# Patient Record
Sex: FEMALE | HISPANIC OR LATINO | Employment: UNEMPLOYED | URBAN - METROPOLITAN AREA
[De-identification: names, ages, dates, MRNs, and addresses within clinical notes are randomized per-mention and may not be internally consistent; named-entity substitution may affect disease eponyms.]

---

## 2017-06-02 ENCOUNTER — GYNECOLOGY VISIT (OUTPATIENT)
Dept: OBGYN | Facility: MEDICAL CENTER | Age: 42
End: 2017-06-02
Payer: COMMERCIAL

## 2017-06-02 VITALS
HEIGHT: 60 IN | SYSTOLIC BLOOD PRESSURE: 100 MMHG | BODY MASS INDEX: 39.66 KG/M2 | WEIGHT: 202 LBS | DIASTOLIC BLOOD PRESSURE: 70 MMHG

## 2017-06-02 DIAGNOSIS — Z30.49 ENCOUNTER FOR SURVEILLANCE OF OTHER CONTRACEPTIVE: ICD-10-CM

## 2017-06-02 DIAGNOSIS — Z12.39 SCREENING FOR BREAST CANCER: ICD-10-CM

## 2017-06-02 DIAGNOSIS — Z97.5 IUD (INTRAUTERINE DEVICE) IN PLACE: ICD-10-CM

## 2017-06-02 PROCEDURE — 99203 OFFICE O/P NEW LOW 30 MIN: CPT | Performed by: OBSTETRICS & GYNECOLOGY

## 2017-06-02 NOTE — PROGRESS NOTES
Chief Complaint   Patient presents with   • Other     Discuss IUD removal and tubal ligation.        History of present illness: 42 y.o. presents with above chief complaint. Pt currently has a Mirena IUD in place and desires to have it removed and have a BTL. Pt states she had the Mirena placed for heavy bleeding as well as contraception. She has done well with the Mirena but due to . Pt thought BTL would control her bleeding as well as Mirena but was permanent. Pt received her pap this year at [facility] and was normal. Will obtain mammo from [facility]. Denies irregular bleeding, discharge, fever, chills.      Review of systems:  Pertinent positives documented in HPI and a comprehensive review of system is negative as follows:    Constitutional ROS: No unexpected change in weight, No weakness, No fatigue, No unexplained fevers, sweats, or chills  Mouth/Throat ROS: No bleeding gums, No sore throat, No recent change in voice or hoarseness  Neck ROS: No lumps or masses, No swollen glands, No significant pain in neck  Pulmonary ROS: No chronic cough, sputum, or hemoptysis, No dyspnea on exertion, No wheezing, No shortness of breath, No recent change in breathing  Cardiovascular ROS: No exercise intolerance, No chest pain, No shortness of breath, No palpitations, No syncope  Genitourinary ROS: Negative for dysuria, frequency and incontinence  Gastrointestinal ROS: No abdominal pain, No change in bowel habits, No significant change in appetite, No nausea, vomiting, diarrhea, or constipation, No hematemesis, No abdominal bloating or early satiety  Breast ROS: No new breast lumps or masses, No severe breast pain, No nipple discharge  Musculoskeletal/Extremities ROS: No cyanosis, No peripheral edema, No pain, redness or swelling on the joints  Hematologic/Lymphatic ROS: No anemia, No abnormal bleeding, No chills, No bruising, No weight loss  Skin/Integumentary ROS: No evidence of bleeding or bruising, No abnormal skin  "lesions noted, No evidence of rash, No itching  Neurologic ROS: No chronic headaches, No seizures, No weakness  Psychiatric ROS: No depression, No anxiety, No psychosis    All PMH, PSH, allergies, social history and FH reviewed and updated today:  Past Medical History   Diagnosis Date   • GDM (gestational diabetes mellitus) 3/8/2012       Past Surgical History   Procedure Laterality Date   • Cholecystectomy  2000       Allergies:   Allergies   Allergen Reactions   • Penicillins Rash     Pt states \"I get a rash all over my body\".       Social History     Social History   • Marital Status:      Spouse Name: N/A   • Number of Children: N/A   • Years of Education: N/A     Occupational History   • Not on file.     Social History Main Topics   • Smoking status: Never Smoker    • Smokeless tobacco: Not on file   • Alcohol Use: No   • Drug Use: No   • Sexual Activity:     Partners: Male      Comment: Pt. states was on pelvic rest.     Other Topics Concern   • Not on file     Social History Narrative       History reviewed. No pertinent family history.    Physical exam:  Blood pressure 100/70, height 1.524 m (5'), weight 91.627 kg (202 lb), currently breastfeeding.    GENERAL APPEARANCE: healthy, alert, no distress, cooperative, smiling, obese  NECK nontender, no masses, thyromegaly or nodules  HEART RRR with normal S1 and S2 ,no murmurs, no gallops, no peripheral edema  LUNG clear to auscultation, normal respiratory effort  ABDOMEN Abdomen soft, non-tender. BS normal. No masses,  No organomegaly  FEMALE GYN: normal female external genitalia without lesions, no vaginal discharge noted, vulva pink without erythema or friability, urethra is normal without discharge or scarring, no bladder fullness or masses, normal vagina and normal vaginal tone, normal cervix, normal  uterus, size and consistency, normal adnexa without tenderness, IUD strings visible, normal anus and perineum.  No inguinal hernia " present.  EXTREMITIES:negative clubbing, cyanosis, edema    NEURO Awake, alert and oriented x 3, Normal gait, no sensory deficits  SKIN No rashes, or ulcers or lesions seen  PSYCHIATRIC: Patient shows appropriate affect, is alert and oriented x3, intact judgment and insight.        1. Encounter for surveillance of other contraceptive     2. IUD (intrauterine device) in place     3. Screening for breast cancer  MA-MAMMO SCREENING BILAT W/MICHEAL W/CAD     Spent  32 minutes in face-to-face patient contact in which greater than 50% of that visit was spent in counseling/coordination of care of contraceptive methods including medical and surgical options of care with risks and benefits. Discussed with patient her heavy menses can restart with removal of Mirena and BTL as contraceptive method. Also discussed risks and benefits of surgery. After discussion, pt will have another Mirena. Referral placed.

## 2017-06-02 NOTE — MR AVS SNAPSHOT
"Mavis Blackburn   2017 10:45 AM   Gynecology Visit   MRN: [id number]    Department[facility]   Dept Phone:  [phone]    Description:  Female : 1975   Provider:  Karla Whitfield M.D.           Reason for Visit     Other Discuss IUD removal and tubal ligation.       Allergies as of 2017     Allergen Noted Reactions    Penicillins 2012   Rash    Pt states \"I get a rash all over my body\".      You were diagnosed with     Encounter for surveillance of other contraceptive   [5486904]       IUD (intrauterine device) in place   [[id number]]       Screening for breast cancer   [[id number]]         Vital Signs     Blood Pressure Height Weight Body Mass Index Smoking Status       100/70 mmHg 1.524 m (5') 91.627 kg (202 lb) 39.45 kg/m2 Never Smoker        Basic Information     Date Of Birth Sex Race Ethnicity Preferred Language    1975 Female  or   Origin (Nigerian,Barbadian,Anguillan,Qatari, etc) Nigerian      Problem List              ICD-10-CM Priority Class Noted - Resolved    H. pylori  A04.8   2011 - Present    Postpartum care and examination of lactating mother Z39.1   2012 - Present    HEMORRHOIDS    2012 - Present    Chest pain R07.9   2015 - Present    Biliary colic K80.50   2016 - Present      Health Maintenance        Date Due Completion Dates    MAMMOGRAM 2018 (Done)    Override on 2017: Done ([facility]ammo [facility])    PAP SMEAR 2020 (Done), 2011    Override on 2017: Done ([facility] - normal per pt)    IMM DTaP/Tdap/Td Vaccine (2 - Td) 2022            Current Immunizations     Tdap Vaccine 2012 10:45 AM      Below and/or attached are the medications your provider expects you to take. Review all of your home medications and newly ordered medications with your provider and/or pharmacist. Follow medication instructions as directed by your provider and/or pharmacist. Please keep your " medication list with you and share with your provider. Update the information when medications are discontinued, doses are changed, or new medications (including over-the-counter products) are added; and carry medication information at all times in the event of emergency situations     Allergies:  PENICILLINS - Rash               Medications  Valid as of: June 02, 2017 - 11:33 AM    Generic Name Brand Name Tablet Size Instructions for use    Pantoprazole Sodium (Tablet Delayed Response) PROTONIX 40 MG Take 1 Tab by mouth every day.        Sucralfate (Tab) CARAFATE 1 GM Take 1 Tab by mouth 4 Times a Day,Before Meals and at Bedtime.        .                 Medicines prescribed today were sent to:     [id number] - [place] - [address]    [address] [place] [address]    Phone: [phone] Fax: [phone]    Open 24 Hours?: No      Medication refill instructions:       If your prescription bottle indicates you have medication refills left, it is not necessary to call your provider’s office. Please contact your pharmacy and they will refill your medication.    If your prescription bottle indicates you do not have any refills left, you may request refills at any time through one of the following ways: The online [employer] system (except Urgent Care), by calling your provider’s office, or by asking your pharmacy to contact your provider’s office with a refill request. Medication refills are processed only during regular business hours and may not be available until the next business day. Your provider may request additional information or to have a follow-up visit with you prior to refilling your medication.   *Please Note: Medication refills are assigned a new Rx number when refilled electronically. Your pharmacy may indicate that no refills were authorized even though a new prescription for the same medication is available at the pharmacy. Please request the medicine by name with the pharmacy before  contacting your provider for a refill.        Your To Do List     Future Labs/Procedures Complete By Expires    MA-MAMMO SCREENING BILAT W/MICHEAL W/CAD  As directed 6/2/2018      Referral     A referral request has been sent to our patient care coordination department. Please allow 3-5 business days for us to process this request and contact you either by phone or mail. If you do not hear from us by the 5th business day, please call us at [phone].           CrowdRise Access Code: [email or web address]  Expires: 7/2/2017 11:33 AM    CrowdRise  A secure, online tool to manage your health information     [employer]’s CrowdRise® is a secure, online tool that connects you to your personalized health information from the privacy of your home -- day or night - making it very easy for you to manage your healthcare. Once the activation process is completed, you can even access your medical information using the CrowdRise rubia, which is available for free in the Apple Rubia store or Google Play store.     CrowdRise provides the following levels of access (as shown below):   My Chart Features   [facility] Doctor [facility]  Specialists [facility]  Urgent  Care [facility]  Primary Care  Doctor   Email your healthcare team securely and privately 24/7 X X X    Manage appointments: schedule your next appointment; view details of past/upcoming appointments X      Request prescription refills. X      View recent personal medical records, including lab and immunizations X X X X   View health record, including health history, allergies, medications X X X X   Read reports about your outpatient visits, procedures, consult and ER notes X X X X   See your discharge summary, which is a recap of your hospital and/or ER visit that includes your diagnosis, lab results, and care plan. X X       How to register for CrowdRise:  1. Go to  [email or web address].  2. Click on the Sign Up Now box, which takes you to the New Member Sign Up page. You will  need to provide the following information:  a. Enter your Stockpile Access Code exactly as it appears at the top of this page. (You will not need to use this code after you’ve completed the sign-up process. If you do not sign up before the expiration date, you must request a new code.)   b. Enter your date of birth.   c. Enter your home email address.   d. Click Submit, and follow the next screen’s instructions.  3. Create a Devext ID. This will be your Stockpile login ID and cannot be changed, so think of one that is secure and easy to remember.  4. Create a Stockpile password. You can change your password at any time.  5. Enter your Password Reset Question and Answer. This can be used at a later time if you forget your password.   6. Enter your e-mail address. This allows you to receive e-mail notifications when new information is available in Stockpile.  7. Click Sign Up. You can now view your health information.    For assistance activating your Stockpile account, call [phone]

## 2017-10-19 ENCOUNTER — OFFICE VISIT (OUTPATIENT)
Dept: URGENT CARE | Facility: CLINIC | Age: 42
End: 2017-10-19
Payer: COMMERCIAL

## 2017-10-19 VITALS
DIASTOLIC BLOOD PRESSURE: 80 MMHG | OXYGEN SATURATION: 98 % | HEIGHT: 58 IN | HEART RATE: 70 BPM | WEIGHT: 204 LBS | BODY MASS INDEX: 42.82 KG/M2 | TEMPERATURE: 97.4 F | SYSTOLIC BLOOD PRESSURE: 120 MMHG | RESPIRATION RATE: 16 BRPM

## 2017-10-19 DIAGNOSIS — J30.89 ENVIRONMENTAL AND SEASONAL ALLERGIES: ICD-10-CM

## 2017-10-19 DIAGNOSIS — J01.90 ACUTE BACTERIAL SINUSITIS: ICD-10-CM

## 2017-10-19 DIAGNOSIS — B96.89 ACUTE BACTERIAL SINUSITIS: ICD-10-CM

## 2017-10-19 PROCEDURE — 99214 OFFICE O/P EST MOD 30 MIN: CPT | Performed by: NURSE PRACTITIONER

## 2017-10-19 RX ORDER — DOXYCYCLINE HYCLATE 100 MG
100 TABLET ORAL 2 TIMES DAILY
Qty: 14 TAB | Refills: 0 | Status: SHIPPED | OUTPATIENT
Start: 2017-10-19 | End: 2017-10-26

## 2017-10-19 RX ORDER — DOXYCYCLINE HYCLATE 100 MG
100 TABLET ORAL 2 TIMES DAILY
Qty: 14 TAB | Refills: 0 | Status: SHIPPED | OUTPATIENT
Start: 2017-10-19 | End: 2017-10-19

## 2017-10-19 RX ORDER — FLUTICASONE PROPIONATE 50 MCG
2 SPRAY, SUSPENSION (ML) NASAL DAILY
Qty: 16 G | Refills: 2 | Status: SHIPPED | OUTPATIENT
Start: 2017-10-19 | End: 2017-10-19

## 2017-10-19 RX ORDER — FLUTICASONE PROPIONATE 50 MCG
2 SPRAY, SUSPENSION (ML) NASAL DAILY
Qty: 16 G | Refills: 3 | Status: SHIPPED | OUTPATIENT
Start: 2017-10-19

## 2017-10-19 RX ORDER — TRIAMCINOLONE ACETONIDE 40 MG/ML
40 INJECTION, SUSPENSION INTRA-ARTICULAR; INTRAMUSCULAR ONCE
Status: COMPLETED | OUTPATIENT
Start: 2017-10-19 | End: 2017-10-19

## 2017-10-19 RX ADMIN — TRIAMCINOLONE ACETONIDE 40 MG: 40 INJECTION, SUSPENSION INTRA-ARTICULAR; INTRAMUSCULAR at 11:42

## 2017-10-19 ASSESSMENT — ENCOUNTER SYMPTOMS
MYALGIAS: 0
WHEEZING: 0
CHILLS: 0
NAUSEA: 0
SORE THROAT: 1
SHORTNESS OF BREATH: 0
COUGH: 1
SPUTUM PRODUCTION: 0
FEVER: 0
HEADACHES: 1
DIARRHEA: 0
EYE DISCHARGE: 0
EYE REDNESS: 0
ORTHOPNEA: 0

## 2017-10-19 NOTE — PROGRESS NOTES
"Subjective:      Mavis Blackburn is a 42 y.o. female who presents with Allergic Rhinitis (x 1 month, very itchy eyes, watery eyes,  headaches, runny nose and sore throat)            HPI New problem. 42 year old female with month long history of nasal congestion, headaches, runny nose, sore throat and itchy eyes. She has history of seasonal allergies this time of year. She denies fever, chills, myalgia, nausea or vomiting. She has tried otc claritin with no relief. Requesting kenalog shot here in clinic. She has never had one.  Allergies, medications and history reviewed by me today      Review of Systems   Constitutional: Negative for chills, fever and malaise/fatigue.   HENT: Positive for congestion and sore throat. Negative for ear pain.    Eyes: Negative for discharge and redness.        +itchy eyes.   Respiratory: Positive for cough. Negative for sputum production, shortness of breath and wheezing.    Cardiovascular: Negative for chest pain and orthopnea.   Gastrointestinal: Negative for diarrhea and nausea.   Musculoskeletal: Negative for myalgias.   Neurological: Positive for headaches.   Endo/Heme/Allergies: Negative for environmental allergies.          Objective:     /80   Pulse 70   Temp 36.3 °C (97.4 °F)   Resp 16   Ht 1.473 m (4' 10\")   Wt 92.5 kg (204 lb)   SpO2 98%   BMI 42.64 kg/m²      Physical Exam   Constitutional: She is oriented to person, place, and time. She appears well-developed and well-nourished. No distress.   HENT:   Head: Normocephalic and atraumatic.   Right Ear: External ear and ear canal normal. Tympanic membrane is not injected and not perforated. No middle ear effusion.   Left Ear: External ear and ear canal normal. Tympanic membrane is not injected and not perforated.  No middle ear effusion.   Nose: Mucosal edema present.   Mouth/Throat: Posterior oropharyngeal erythema present. No oropharyngeal exudate.   Nasal mucosa red bilaterally with edema.   Eyes: Conjunctivae " are normal. Right eye exhibits no discharge. Left eye exhibits no discharge.   Neck: Normal range of motion. Neck supple.   Cardiovascular: Normal rate, regular rhythm and normal heart sounds.    No murmur heard.  Pulmonary/Chest: Effort normal and breath sounds normal. No respiratory distress.   Musculoskeletal: Normal range of motion.   Normal movement of all 4 extremities.   Lymphadenopathy:     She has no cervical adenopathy.        Right: No supraclavicular adenopathy present.        Left: No supraclavicular adenopathy present.   Neurological: She is alert and oriented to person, place, and time. Gait normal.   Skin: Skin is warm and dry.   Psychiatric: She has a normal mood and affect. Her behavior is normal. Thought content normal.               Assessment/Plan:     1. Acute bacterial sinusitis  doxycycline (VIBRAMYCIN) 100 MG Tab    DISCONTINUED: doxycycline (VIBRAMYCIN) 100 MG Tab   2. Environmental and seasonal allergies  triamcinolone acetonide (KENALOG-40) injection 40 mg    fluticasone (FLONASE) 50 MCG/ACT nasal spray    DISCONTINUED: fluticasone (FLONASE) 50 MCG/ACT nasal spray   I have placed the below orders and discussed them with an approved delegating provider.  The MA is performing the below orders under the direction of Dr. Zay BURGESS Differential diagnosis, natural history, supportive care, and indications for immediate follow-up discussed at length.

## 2017-12-18 ENCOUNTER — OFFICE VISIT (OUTPATIENT)
Dept: URGENT CARE | Facility: PHYSICIAN GROUP | Age: 42
End: 2017-12-18
Payer: COMMERCIAL

## 2017-12-18 VITALS
DIASTOLIC BLOOD PRESSURE: 80 MMHG | TEMPERATURE: 98.6 F | SYSTOLIC BLOOD PRESSURE: 130 MMHG | RESPIRATION RATE: 18 BRPM | BODY MASS INDEX: 44.01 KG/M2 | HEIGHT: 57 IN | OXYGEN SATURATION: 97 % | WEIGHT: 204 LBS | HEART RATE: 72 BPM

## 2017-12-18 DIAGNOSIS — J01.40 ACUTE NON-RECURRENT PANSINUSITIS: Primary | ICD-10-CM

## 2017-12-18 PROCEDURE — 99214 OFFICE O/P EST MOD 30 MIN: CPT | Performed by: NURSE PRACTITIONER

## 2017-12-18 RX ORDER — DOXYCYCLINE HYCLATE 100 MG/1
100 CAPSULE ORAL 2 TIMES DAILY
Qty: 14 CAP | Refills: 0 | Status: SHIPPED | OUTPATIENT
Start: 2017-12-18 | End: 2019-03-25

## 2017-12-18 ASSESSMENT — ENCOUNTER SYMPTOMS
HEADACHES: 1
MYALGIAS: 0
SINUS PAIN: 1
CHILLS: 0
SORE THROAT: 1
SHORTNESS OF BREATH: 0
SPUTUM PRODUCTION: 1
VOMITING: 0
COUGH: 1
WEAKNESS: 1
DIARRHEA: 0
SINUS PRESSURE: 1
FEVER: 0
NAUSEA: 0

## 2017-12-19 NOTE — PATIENT INSTRUCTIONS
Sinusitis en adultos  (Sinusitis, Adult)  La sinusitis es el enrojecimiento, el dolor y la inflamación de los senos paranasales. Los senos paranasales son cavidades de aire que están dentro de los huesos de la neal. Se encuentran debajo de los ojos, en la mitad de la frente y encima de los ojos. En los senos paranasales sanos, el moco puede drenar y el aire circula a través de ellos en arreola sarath hacia la nariz. Sin embargo, cuando se inflaman, el moco y el aire quedan atrapados. [place] hace que se desarrollen bacterias y otros gérmenes que causan infección.  La sinusitis puede desarrollarse rápidamente y durar solo un tiempo corto (aguda) o continuar por un período sheldon (crónica). La sinusitis que dura más de 12 semanas se considera crónica.  CAUSAS  Las causas de la sinusitis son:  · Alergias.  · Las anomalías estructurales, kam el desplazamiento del cartílago que separa las fosas nasales (desvío del tabique), que puede disminuir el flujo de aire por la nariz y los senos paranasales, y afectar arreola drenaje.  · Las anomalías funcionales, kam cuando los pequeños pelos (cilias) que se encuentran en los senos paranasales y ayudan a eliminar el moco no funcionan correctamente o no están presentes.  SIGNOS Y SÍNTOMAS  Los síntomas de la sinusitis aguda y crónica son los mismos. Los síntomas principales son el dolor y la presión alrededor de los senos paranasales afectados. Otros síntomas son:  · Dolor en los dientes superiores.  · Dolor de oídos.  · Dolor de jyoti.  · Mal aliento.  · Disminución del sentido del olfato y del gusto.  · Tos, que empeora al acostarse.  · Fatiga.  · Fiebre.  · Drenaje de moco espeso por la nariz, que generalmente es de color gallito y puede contener pus (purulento).  · Hinchazón y calor en los senos paranasales afectados.  DIAGNÓSTICO  Arreola médico le realizará un examen físico. Nanci el examen, el médico puede hacer cualquiera de estas cosas para determinar si usted tiene sinusitis aguda o  crónica:  · Le revisará la nariz para buscar signos de crecimientos anormales en las fosas nasales (pólipos nasales).  · Palpará los senos paranasales afectados para buscar signos de infección.  · Observará la parte interna de los senos paranasales con un dispositivo que tiene radha trudi (endoscopio).  Si el médico sospecha que usted sufre sinusitis crónica, podrá indicar radha o más de las siguientes pruebas:  · Pruebas de alergia.  · Cultivo de las secreciones nasales. Se extrae radha muestra de moco de la nariz, que se envía al laboratorio para detectar bacterias.  · Citología nasal. Se extrae radha muestra de moco de la nariz, que el médico examina para determinar si la sinusitis está relacionada con radha alergia.  TRATAMIENTO  La mayoría de los casos de sinusitis aguda se deben a radha infección viral y se resuelven espontáneamente en un período de 10 días. A veces, se recetan medicamentos kam ayuda para aliviar los síntomas tanto de la sinusitis aguda kam de la crónica. Estos pueden incluir analgésicos, descongestivos, aerosoles nasales con corticoides o aerosoles de solución salina.  Sin embargo, para la sinusitis por infección bacteriana, el médico le recetará antibióticos. Los antibióticos son medicamentos que destruyen las bacterias que causan la infección.  Con poca frecuencia, la sinusitis tiene arreola origen en radha infección por hongos. En estos casos, el médico le recetará un medicamento antimicótico.  Para algunos casos de sinusitis crónica, es necesario someterse a radha cirugía. Generalmente, se trata de casos en los que la sinusitis se repite más de 3 veces al año, a pesar de otros tratamientos.  INSTRUCCIONES PARA EL CUIDADO EN EL HOGAR  · Beber abundante agua. Los líquidos ayudan a disolver el moco, para que drene más fácilmente de los senos paranasales.  · Use un humidificador.  · Inhale vapor de 3 a 4 veces al día (por ejemplo, siéntese en el baño con la ducha abierta).  · Aplíquese un paño tibio y húmedo en  la neal 3 o 4 veces al día o según las indicaciones del médico.  · Use un aerosol nasal salino para ayudar a humedecer y limpiar los senos nasales.  · Amonate los medicamentos solamente kam se lo haya indicado el médico.  · Si le recetaron un antimicótico o un antibiótico, asegúrese de terminarlos, incluso si comienza a sentirse mejor.  SOLICITE ATENCIÓN MÉDICA DE INMEDIATO SI:  · Siente más dolor o sufre tiffanie de jyoti intensos.  · Tiene náuseas, vómitos o somnolencia.  · Observa hinchazón alrededor del cande.  · Tiene problemas de visión.  · Presenta rigidez en el latrell.  · Tiene dificultad para respirar.     Esta información no tiene kam fin reemplazar el consejo del médico. Asegúrese de hacerle al médico cualquier pregunta que tenga.     Document Released: 09/27/2006 Document Revised: 01/08/2016  Elsevier Interactive Patient Education ©2016 Elsevier Inc.

## 2017-12-19 NOTE — PROGRESS NOTES
"Subjective:      Mavis Blackburn is a 42 y.o. female who presents with Cough (x2 weeks R Ear pain)            Medications, Allergies and Prior Medical Hx reviewed and updated in [facility].with patient/family today           Sinusitis   This is a new problem. The current episode started 1 to 4 weeks ago (2 wks). The problem has been gradually worsening since onset. There has been no fever. The pain is moderate. Associated symptoms include congestion, coughing, headaches, sinus pressure and a sore throat. Pertinent negatives include no chills, ear pain or shortness of breath. Past treatments include oral decongestants. The treatment provided mild relief.       Review of Systems   Constitutional: Positive for malaise/fatigue. Negative for chills and fever.   HENT: Positive for congestion, sinus pain, sinus pressure and sore throat. Negative for ear discharge and ear pain.    Respiratory: Positive for cough and sputum production. Negative for shortness of breath.    Gastrointestinal: Negative for diarrhea, nausea and vomiting.   Musculoskeletal: Negative for myalgias.   Neurological: Positive for weakness and headaches.          Objective:     /80   Pulse 72   Temp 37 °C (98.6 °F)   Resp 18   Ht 1.448 m (4' 9\")   Wt 92.5 kg (204 lb)   SpO2 97%   Breastfeeding? No   BMI 44.15 kg/m²      Physical Exam   Constitutional: She appears well-developed and well-nourished. No distress.   HENT:   Head: Normocephalic and atraumatic.   Right Ear: Tympanic membrane and ear canal normal.   Left Ear: Tympanic membrane and ear canal normal.   Nose: Rhinorrhea present.   Mouth/Throat: Uvula is midline and mucous membranes are normal. No trismus in the jaw. No uvula swelling. Posterior oropharyngeal edema and posterior oropharyngeal erythema present. No oropharyngeal exudate.   Eyes: Conjunctivae are normal. Pupils are equal, round, and reactive to light.   Neck: Neck supple.   Cardiovascular: Normal rate, regular rhythm and " normal heart sounds.    Pulmonary/Chest: Effort normal and breath sounds normal. No respiratory distress. She has no wheezes.   Lymphadenopathy:     She has cervical adenopathy.   Neurological: She is alert.   Skin: Skin is warm and dry. Capillary refill takes less than 2 seconds.   Psychiatric: She has a normal mood and affect. Her behavior is normal.   Vitals reviewed.              Assessment/Plan:       1. Acute non-recurrent pansinusitis  doxycycline (VIBRAMYCIN) 100 MG Cap           Modified Epic generated written imformation provided along with verbal instructions    Rest, Fluids, tylenol, ibuprofen, sinus irrigation,  humidified air, and otc decongestants  Pt will go to the ER for worsening or changing symptoms as discussed,   Follow-up with your primary care provider or return here if not improving in 5 - 7 days   Discharge instructions discussed with pt/family who verbalize understanding and agreement with poc

## 2018-10-16 ENCOUNTER — OFFICE VISIT (OUTPATIENT)
Dept: URGENT CARE | Facility: CLINIC | Age: 43
End: 2018-10-16
Payer: COMMERCIAL

## 2018-10-16 VITALS
TEMPERATURE: 97.5 F | HEIGHT: 59 IN | RESPIRATION RATE: 14 BRPM | OXYGEN SATURATION: 95 % | HEART RATE: 79 BPM | DIASTOLIC BLOOD PRESSURE: 90 MMHG | WEIGHT: 203 LBS | BODY MASS INDEX: 40.92 KG/M2 | SYSTOLIC BLOOD PRESSURE: 130 MMHG

## 2018-10-16 DIAGNOSIS — K64.9 HEMORRHOIDS, UNSPECIFIED HEMORRHOID TYPE: ICD-10-CM

## 2018-10-16 DIAGNOSIS — K59.00 CONSTIPATION, UNSPECIFIED CONSTIPATION TYPE: ICD-10-CM

## 2018-10-16 DIAGNOSIS — F41.9 ANXIETY: ICD-10-CM

## 2018-10-16 PROCEDURE — 99214 OFFICE O/P EST MOD 30 MIN: CPT | Performed by: PHYSICIAN ASSISTANT

## 2018-10-16 RX ORDER — HYDROCORTISONE ACETATE 25 MG/1
25 SUPPOSITORY RECTAL EVERY 12 HOURS
Qty: 10 SUPPOSITORY | Refills: 0 | Status: SHIPPED | OUTPATIENT
Start: 2018-10-16 | End: 2018-10-21

## 2018-10-16 RX ORDER — HYDROXYZINE PAMOATE 25 MG/1
25 CAPSULE ORAL EVERY 8 HOURS PRN
Qty: 28 CAP | Refills: 0 | Status: SHIPPED | OUTPATIENT
Start: 2018-10-16

## 2018-10-18 ASSESSMENT — ENCOUNTER SYMPTOMS
CHILLS: 0
SORE THROAT: 0
BLOOD IN STOOL: 0
SHORTNESS OF BREATH: 0
DIARRHEA: 0
EYE REDNESS: 0
CHANGE IN BOWEL HABIT: 1
NAUSEA: 0
WHEEZING: 0
VOMITING: 0
CONSTIPATION: 0
FEVER: 0
FALLS: 0
ABDOMINAL PAIN: 0
HEADACHES: 0
EYE DISCHARGE: 0

## 2018-10-18 NOTE — PROGRESS NOTES
"Subjective:      Mavis Blackburn is a 43 y.o. female who presents with Constipation (hemorrhoids x3 days)            Patient is a 43-year-old female who presents with hemorrhoids for the last 2-3 days.  Patient reports she believes this began after an episode of constipation of which made her hemorrhoids worse.  She denies any bleeding, abdominal pain or blood in her stool.  She does report prior history of same after her last child of which is gradually gotten worse.  Also of note today patient is requesting something for anxiety at this time to take as needed.   along with a chaperone was present during the visit today.      Hemorrhoids   This is a new problem. The current episode started in the past 7 days. The problem occurs constantly. The problem has been unchanged. Associated symptoms include a change in bowel habit. Pertinent negatives include no abdominal pain, chills, fever, headaches, nausea, rash, sore throat or vomiting. Exacerbated by: Sitting or wiping. Treatments tried: Hemorrhoid cream. The treatment provided mild relief.       Review of Systems   Constitutional: Negative for chills, fever and malaise/fatigue.   HENT: Negative for sore throat.    Eyes: Negative for discharge and redness.   Respiratory: Negative for shortness of breath and wheezing.    Gastrointestinal: Positive for change in bowel habit and hemorrhoids. Negative for abdominal pain, blood in stool, constipation, diarrhea, melena, nausea and vomiting.   Genitourinary: Negative for dysuria, frequency, hematuria and urgency.   Musculoskeletal: Negative for falls and joint pain.   Skin: Negative for itching and rash.   Neurological: Negative for headaches.   All other systems reviewed and are negative.         Objective:     /90 (BP Location: Left arm, Patient Position: Sitting, BP Cuff Size: Adult)   Pulse 79   Temp 36.4 °C (97.5 °F)   Resp 14   Ht 1.486 m (4' 10.5\")   Wt 92.1 kg (203 lb)   SpO2 95%   BMI 41.70 " "kg/m²    PMH: reviewed.   MEDS:   Current Outpatient Prescriptions:   •  hydrocortisone (ANUSOL-HC) 25 MG Suppos, Insert 1 Suppository in rectum every 12 hours for 5 days., Disp: 10 Suppository, Rfl: 0  •  docusate sodium (COLACE) 50 MG Cap, Take 2 Caps by mouth 2 times a day., Disp: 20 Cap, Rfl: 0  •  hydrOXYzine pamoate (VISTARIL) 25 MG Cap, Take 1 Cap by mouth every 8 hours as needed for Anxiety (May cause sedation)., Disp: 28 Cap, Rfl: 0  •  doxycycline (VIBRAMYCIN) 100 MG Cap, Take 1 Cap by mouth 2 times a day., Disp: 14 Cap, Rfl: 0  •  fluticasone (FLONASE) 50 MCG/ACT nasal spray, Spray 2 Sprays in nose every day., Disp: 16 g, Rfl: 3  •  pantoprazole (PROTONIX) 40 MG TBEC, Take 1 Tab by mouth every day., Disp: 30 Tab, Rfl: 0  ALLERGIES:   Allergies   Allergen Reactions   • Penicillins Rash     Pt states \"I get a rash all over my body\".     SURGHX:   Past Surgical History:   Procedure Laterality Date   • CHOLECYSTECTOMY  2000     SOCHX:  reports that she has never smoked. She has never used smokeless tobacco. She reports that she does not drink alcohol or use drugs.  FH: Family history was reviewed, no pertinent findings to report    Physical Exam   Constitutional: She is oriented to person, place, and time. She appears well-developed and well-nourished. No distress.   HENT:   Head: Normocephalic and atraumatic.   Eyes: Pupils are equal, round, and reactive to light. Conjunctivae and EOM are normal.   Neck: Normal range of motion. Neck supple. No tracheal deviation present.   Cardiovascular: Normal rate.    Pulmonary/Chest: Effort normal.   Abdominal: Soft. Bowel sounds are normal. There is no tenderness.   Genitourinary:         Genitourinary Comments: -External hemorrhoid nonthrombosed -notable tenderness on exam.   With slight fluctuance without surrounding induration or active bleeding. Without fissures.    Musculoskeletal: Normal range of motion. She exhibits no edema.   Neurological: She is alert and " oriented to person, place, and time. Coordination normal.   Skin: Skin is warm. No rash noted.   Psychiatric: She has a normal mood and affect. Her behavior is normal. Judgment and thought content normal.   Vitals reviewed.              Assessment/Plan:     1. Hemorrhoids, unspecified hemorrhoid type  - hydrocortisone (ANUSOL-HC) 25 MG Suppos; Insert 1 Suppository in rectum every 12 hours for 5 days.  Dispense: 10 Suppository; Refill: 0    2. Constipation, unspecified constipation type  - docusate sodium (COLACE) 50 MG Cap; Take 2 Caps by mouth 2 times a day.  Dispense: 20 Cap; Refill: 0    3. Anxiety  - hydrOXYzine pamoate (VISTARIL) 25 MG Cap; Take 1 Cap by mouth every 8 hours as needed for Anxiety (May cause sedation).  Dispense: 28 Cap; Refill: 0    Encouraged sitz baths, continue with lidocaine cream- will write for anusol.   Avoid constipation- will write for stool softener.  Increase water.  We will also write for hydroxyzine for anxiety discussed the risk of sedation.  Patient given precautionary s/sx that mandate immediate follow up and evaluation in the ED. Advised of risks of not doing so.    DDX, Supportive care, and indications for immediate follow-up discussed with patient.    Instructed to return to clinic or nearest emergency department if we are not available for any change in condition, further concerns, or worsening of symptoms.    The patient demonstrated a good understanding and agreed with the treatment plan.  Please note that this dictation was created using voice recognition software. I have made every reasonable attempt to correct obvious errors, but I expect that there are errors of grammar and possibly content that I did not discover before finalizing the note.

## 2018-10-19 ENCOUNTER — OFFICE VISIT (OUTPATIENT)
Dept: URGENT CARE | Facility: CLINIC | Age: 43
End: 2018-10-19
Payer: COMMERCIAL

## 2018-10-19 VITALS
TEMPERATURE: 97.7 F | RESPIRATION RATE: 16 BRPM | SYSTOLIC BLOOD PRESSURE: 120 MMHG | DIASTOLIC BLOOD PRESSURE: 74 MMHG | HEART RATE: 72 BPM | OXYGEN SATURATION: 97 % | BODY MASS INDEX: 40.92 KG/M2 | WEIGHT: 203 LBS | HEIGHT: 59 IN

## 2018-10-19 DIAGNOSIS — K64.4 EXTERNAL HEMORRHOID: Primary | ICD-10-CM

## 2018-10-19 PROCEDURE — 99214 OFFICE O/P EST MOD 30 MIN: CPT | Performed by: PHYSICIAN ASSISTANT

## 2018-10-19 RX ORDER — LIDOCAINE HCL AND HYDROCORTISONE ACETATE 30; 10 MG/G; MG/G
1 CREAM RECTAL 2 TIMES DAILY
Qty: 1 KIT | Refills: 1
Start: 2018-10-19

## 2018-10-20 NOTE — PATIENT INSTRUCTIONS
Hemorroides  (Hemorrhoids)  Las hemorroides son venas inflamadas adentro o alrededor del recto o del ano. Hay dos tipos de hemorroides:  · Hemorroides internas. Se eddy en las venas del interior del recto. Pueden abultarse hacia afuera, irritarse y doler.  · Hemorroides externas. Se producen en las venas externas del ano y pueden sentirse kam un bulto o david hinchada, dura y dolorosa cerca del ano.  La mayoría de las hemorroides no causan problemas graves y se pueden controlar con tratamientos caseros kam los cambios en la dieta y el estilo de carie. Si los tratamientos caseros no ayudan con los síntomas, se pueden realizar procedimientos para reducir o extirpar las hemorroides.  CAUSAS  La causa de esta afección es el aumento de la presión en la david anal. Esta presión puede ser causada por distintos factores, por ejemplo:  · Estreñimiento.  · Dificultad para defecar.  · Diarrea.  · Embarazo.  · Obesidad.  · Estar sentado vishnu largos períodos.  · Levantar objetos pesados u otras actividades que impliquen esfuerzo.  · Sexo anal.  SÍNTOMAS  Los síntomas de esta afección incluyen lo siguiente:  · Dolor.  · Picazón o irritación anal.  · Sangrado rectal.  · Pérdida de materia fecal (heces).  · Inflamación anal.  · Facundo o más bultos alrededor del ano.  DIAGNÓSTICO  Esta afección se diagnostica frecuentemente a través de un examen visual. Posiblemente le realicen otros tipos de pruebas o estudios, kam los siguientes:  · Examen de la david rectal con radha mano enguantada (examen digital rectal).  · Examen del canal anal utilizando un pequeño tubo (anoscopio).  · Análisis de didier si ha perdido radha cantidad significativa de didier.  · Un estudio para observar el interior del colon (sigmoidoscopia o colonoscopia).  TRATAMIENTO  Esta afección generalmente se puede tratar en el hogar. Se pueden realizar diversos procedimientos si los cambios en la dieta, en el estilo de carie y otros tratamientos caseros no alivian los  síntomas. Estos procedimientos pueden ayudar a reducir o extirpar las hemorroides completamente. Algunos de estos procedimientos son quirúrgicos y otros no. Algunos de los procedimientos más frecuentes son los siguientes:  · Ligadura con silverio elástica. Las bandas elásticas se colocan en la base de las hemorroides para interrumpir la irrigación de didier.  · Escleroterapia. Se inyecta un medicamento en las hemorroides para reducir arreola tamaño.  · Coagulación con trudi infrarroja. Se utiliza un tipo de energía lumínica para eliminar las hemorroides.  · Hemorroidectomía. Las hemorroides se extirpan con cirugía y las venas que las irrigan se atan.  · Hemorroidopexia con grapas. Se usa un dispositivo tipo grapa de forma circular para extirpar las hemorroides y unas grapas para cortar la didier que se irriga hacia las hemorroides.  INSTRUCCIONES PARA EL CUIDADO EN EL HOGAR  Comida y bebida   · Consuma alimentos con alto contenido de fibra, kam cereales integrales, porotos, nelsy secos, frutas y verduras. Pregúntele a arreola médico acerca de geovanni productos con fibra añadida en ellos (complementos de fibra).  · Altagracia suficiente líquido para mantener la orina maria de jesus o de color amarillo pálido.  Control del dolor y la hinchazón   · [place] bryant de asiento tibios vishnu 20 minutos, 3 o 4 veces por día para calmar el dolor y las molestias.  · Si se lo indican, aplique hielo en la david afectada. Usar compresas de hielo entre los bryant de asiento puede ser efectivo.  ¨ Ponga el hielo en radha bolsa plástica.  ¨ Coloque radha toalla entre la piel y la bolsa de hielo.  ¨ Coloque el hielo vishnu 20 minutos, 2 a 3 veces por día.  Instrucciones generales   · [place] los medicamentos de venta rosario y los recetados solamente kam se lo haya indicado el médico.  · Aplíquese los medicamentos, cremas o supositorios kam se lo hayan indicado.  · Hugo ejercicios regularmente.  · Vaya al baño cuando sienta la necesidad de defecar. No espere.  · Evite  hacer fuerza al defecar.  · Mantenga la david anal limpia y seca. Use papel higiénico húmedo o toallitas humedecidas después de defecar.  · No pase mucho tiempo sentado en el inodoro. [place] aumenta la afluencia de didier y el dolor.  SOLICITE ATENCIÓN MÉDICA SI:  · Aumenta el dolor y la hinchazón, y no puede controlarlos con los medicamentos o con un tratamiento.  · Tiene radha hemorragia que no puede controlar.  · No puede defecar o lo hace con dificultad.  · Siente dolor o tiene inflamación fuera de la david de las hemorroides.  Esta información no tiene kam fin reemplazar el consejo del médico. Asegúrese de hacerle al médico cualquier pregunta que tenga.  Document Released: 12/18/2006 Document Revised: 04/10/2017 Document Reviewed: 08/31/2016  [employer] Interactive Patient Education © 2017 [employer]

## 2018-10-20 NOTE — PROGRESS NOTES
Subjective:   Pt is a 43 y.o. female who presents with Hemorrhoids (hemorrhoids not getting better still in a lot of pain was seen on 10/16/18)            HPI  Pt seen 3 days ago in the UC for this same reason. Pt has not tried the colace for constipation nor the sitz bathes nor changes her diet. Patient is a 43-year-old female who presents with hemorrhoids for the last 5-8 days.  Patient reports she believes this began after an episode of constipation of which made her hemorrhoids worse.  She denies any bleeding, abdominal pain or blood in her stool.  She does report prior history of same after her last child of which is gradually gotten worse.     along with a chaperone was present during the visit today.     Hemorrhoids   This is an acute on chronic  problem. The current episode started in the past 10 days. The problem occurs constantly. The problem has been unchanged. Associated symptoms include a change in bowel habit. Pertinent negatives include no abdominal pain, chills, fever, headaches, nausea, rash, sore throat or vomiting. Exacerbated by: Sitting or wiping. Treatments tried: Hemorrhoid cream and anusol suppositiories. The treatment provided mild relief. Pt has not taken any Rx medications for this condition. Pt states the pain is a 7-8/10, aching in nature and worse at night. Pt denies CP, SOB, NVD, paresthesias, headaches, dizziness, change in vision, hives, or other joint pain. The pt's medication list, problem list, and allergies have been evaluated and reviewed during today's visit.       PMH:  Past Medical History:   Diagnosis Date   • GDM (gestational diabetes mellitus) 3/8/2012       PSH:  Past Surgical History:   Procedure Laterality Date   • CHOLECYSTECTOMY         Fam Hx:  the patient's family history is not pertinent to their current complaint    Family Status   Relation Status   • Mo Alive   • Fa Alive   • Sis Alive   • Bro Alive   • MGMo    • MGFa    • PGMo     • PGFa        Soc HX:  Social History     Social History   • Marital status:      Spouse name: N/A   • Number of children: N/A   • Years of education: N/A     Occupational History   • Not on file.     Social History Main Topics   • Smoking status: Never Smoker   • Smokeless tobacco: Never Used   • Alcohol use No   • Drug use: No   • Sexual activity: Not Currently     Partners: Male     Other Topics Concern   • Not on file     Social History Narrative   • No narrative on file         Medications:    Current Outpatient Prescriptions:   •  Lidocaine-Hydrocortisone Ace 3-1 % Kit, Insert 1 Application in rectum 2 Times a Day., Disp: 1 Kit, Rfl: 1  •  hydrocortisone (ANUSOL-HC) 25 MG Suppos, Insert 1 Suppository in rectum every 12 hours for 5 days., Disp: 10 Suppository, Rfl: 0  •  docusate sodium (COLACE) 50 MG Cap, Take 2 Caps by mouth 2 times a day., Disp: 20 Cap, Rfl: 0  •  hydrOXYzine pamoate (VISTARIL) 25 MG Cap, Take 1 Cap by mouth every 8 hours as needed for Anxiety (May cause sedation)., Disp: 28 Cap, Rfl: 0  •  doxycycline (VIBRAMYCIN) 100 MG Cap, Take 1 Cap by mouth 2 times a day., Disp: 14 Cap, Rfl: 0  •  fluticasone (FLONASE) 50 MCG/ACT nasal spray, Spray 2 Sprays in nose every day., Disp: 16 g, Rfl: 3  •  pantoprazole (PROTONIX) 40 MG TBEC, Take 1 Tab by mouth every day., Disp: 30 Tab, Rfl: 0      Allergies:  Penicillins    ROS  Constitutional: Negative for fever, chills and malaise/fatigue.   HENT: Negative for congestion and sore throat.    Eyes: Negative for blurred vision, double vision and photophobia.   Respiratory: Negative for cough and shortness of breath.  Cardiovascular: Negative for chest pain and palpitations.   Gastrointestinal: Negative for heartburn, nausea, vomiting, abdominal pain, diarrhea and constipation.   Genitourinary: Negative for dysuria and flank pain. +hemorrhoids  Musculoskeletal: Negative for joint pain and myalgias.   Skin: Negative for itching and  "rash.   Neurological: Negative for dizziness, tingling and headaches.   Endo/Heme/Allergies: Does not bruise/bleed easily.   Psychiatric/Behavioral: Negative for depression. The patient is not nervous/anxious.           Objective:     /74 (BP Location: Right arm, Patient Position: Sitting)   Pulse 72   Temp 36.5 °C (97.7 °F) (Temporal)   Resp 16   Ht 1.486 m (4' 10.5\")   Wt 92.1 kg (203 lb)   SpO2 97%   BMI 41.70 kg/m²      Physical Exam   Genitourinary: Rectal exam shows external hemorrhoid. Rectal exam shows no fissure, no mass, no tenderness, anal tone normal and guaiac negative stool.       Pelvic exam was performed with patient in the knee-chest position. No labial fusion. There is no rash, tenderness, lesion or injury on the right labia. There is no rash, tenderness, lesion or injury on the left labia.         Constitutional: PT is oriented to person, place, and time. PT appears well-developed and well-nourished. No distress.   HENT:   Head: Normocephalic and atraumatic.   Mouth/Throat: Oropharynx is clear and moist. No oropharyngeal exudate.   Eyes: Conjunctivae normal and EOM are normal. Pupils are equal, round, and reactive to light.   Neck: Normal range of motion. Neck supple. No thyromegaly present.   Cardiovascular: Normal rate, regular rhythm, normal heart sounds and intact distal pulses.  Exam reveals no gallop and no friction rub.    No murmur heard.  Pulmonary/Chest: Effort normal and breath sounds normal. No respiratory distress. PT has no wheezes. PT has no rales. Pt exhibits no tenderness.   Abdominal: Soft. Bowel sounds are normal. PT exhibits no distension and no mass. There is no tenderness. There is no rebound and no guarding.   Musculoskeletal: Normal range of motion. PT exhibits no edema and no tenderness.   Neurological: PT is alert and oriented to person, place, and time. PT has normal reflexes. No cranial nerve deficit.   Skin: Skin is warm and dry. No rash noted. PT is not " diaphoretic. No erythema.       Psychiatric: PT has a normal mood and affect. PT behavior is normal. Judgment and thought content normal.          Assessment/Plan:     1. External hemorrhoid    - Lidocaine-Hydrocortisone Ace 3-1 % Kit; Insert 1 Application in rectum 2 Times a Day.  Dispense: 1 Kit; Refill: 1    Encouraged sitz baths, continue with lidocaine cream- will write for anusol cream instead of suppositories.   Avoid constipation- continue stool softener and add Benefiber OTC or Optifiber BID.  Increase water.    Patient given precautionary s/sx that mandate immediate follow up and evaluation in the ED. Advised of risks of not doing so.     DDX, Supportive care, and indications for immediate follow-up discussed with patient.    Instructed to return to clinic or nearest emergency department if we are not available for any change in condition, further concerns, or worsening of symptoms.  Referral to GI for definitve care per pt request

## 2019-03-25 ENCOUNTER — HOSPITAL ENCOUNTER (OUTPATIENT)
Dept: RADIOLOGY | Facility: MEDICAL CENTER | Age: 44
End: 2019-03-25
Attending: FAMILY MEDICINE
Payer: COMMERCIAL

## 2019-03-25 ENCOUNTER — OFFICE VISIT (OUTPATIENT)
Dept: URGENT CARE | Facility: PHYSICIAN GROUP | Age: 44
End: 2019-03-25
Payer: COMMERCIAL

## 2019-03-25 VITALS
WEIGHT: 205 LBS | BODY MASS INDEX: 42.11 KG/M2 | DIASTOLIC BLOOD PRESSURE: 84 MMHG | HEART RATE: 80 BPM | RESPIRATION RATE: 16 BRPM | TEMPERATURE: 98 F | SYSTOLIC BLOOD PRESSURE: 132 MMHG | OXYGEN SATURATION: 96 %

## 2019-03-25 DIAGNOSIS — N92.6 MISSED MENSES: ICD-10-CM

## 2019-03-25 DIAGNOSIS — J22 LOWER RESPIRATORY INFECTION: ICD-10-CM

## 2019-03-25 DIAGNOSIS — H66.001 ACUTE SUPPURATIVE OTITIS MEDIA OF RIGHT EAR WITHOUT SPONTANEOUS RUPTURE OF TYMPANIC MEMBRANE, RECURRENCE NOT SPECIFIED: ICD-10-CM

## 2019-03-25 LAB
INT CON NEG: NEGATIVE
INT CON NEG: NEGATIVE
INT CON POS: POSITIVE
INT CON POS: POSITIVE
POC URINE PREGNANCY TEST: NEGATIVE
S PYO AG THROAT QL: NEGATIVE

## 2019-03-25 PROCEDURE — 71046 X-RAY EXAM CHEST 2 VIEWS: CPT

## 2019-03-25 PROCEDURE — 87880 STREP A ASSAY W/OPTIC: CPT | Performed by: FAMILY MEDICINE

## 2019-03-25 PROCEDURE — 99214 OFFICE O/P EST MOD 30 MIN: CPT | Performed by: FAMILY MEDICINE

## 2019-03-25 PROCEDURE — 81025 URINE PREGNANCY TEST: CPT | Performed by: FAMILY MEDICINE

## 2019-03-25 RX ORDER — DOXYCYCLINE HYCLATE 100 MG
100 TABLET ORAL 2 TIMES DAILY
Qty: 14 TAB | Refills: 0 | Status: SHIPPED | OUTPATIENT
Start: 2019-03-25 | End: 2023-03-15

## 2019-03-25 ASSESSMENT — ENCOUNTER SYMPTOMS
HEADACHES: 0
ABDOMINAL PAIN: 0
NAUSEA: 0
FEVER: 1
VOMITING: 0
DIARRHEA: 0
COUGH: 1
SHORTNESS OF BREATH: 0
SORE THROAT: 1

## 2019-03-26 NOTE — PROGRESS NOTES
Subjective:     Mavis Davison is a 43 y.o. female who presents for Pharyngitis (right ear pain,sore throat,fever, off and on x 2-3 weeks)    HPI  Pt presents for evaluation of a new problem   Pt with sore throat and right ear pain for the past 2-3 weeks   Sore throat is constant, worse with swallowing, and worse on the right side   Also has right sided ear pain   Has a moderate cough   Cough is dry and nonproductive  Cough is overall stable and not worsening  Feels congestion in her chest  Feeling fatigued    Review of Systems   Constitutional: Positive for fever and malaise/fatigue.   HENT: Positive for congestion and sore throat.    Respiratory: Positive for cough. Negative for shortness of breath.    Cardiovascular: Negative for chest pain.   Gastrointestinal: Negative for abdominal pain, diarrhea, nausea and vomiting.   Skin: Negative for rash.   Neurological: Negative for headaches.     PMH:  has a past medical history of GDM (gestational diabetes mellitus) (3/8/2012). She also has no past medical history of Addisons disease (HCC); Adrenal disorder (HCC); Allergy; Anemia; Anxiety; Arrhythmia; Arthritis; ASTHMA; Blood transfusion; Cancer (HCC); CATARACT; CHF (congestive heart failure) (HCC); Clotting disorder (HCC); COPD; Cushings syndrome (HCC); Depression; Diabetic neuropathy (HCC); EMPHYSEMA; GERD (gastroesophageal reflux disease); Glaucoma; Goiter; Headache(784.0); Heart attack (HCC); Heart murmur; HIV (human immunodeficiency virus infection); Hyperlipidemia; Hypertension; IBD (inflammatory bowel disease); Kidney disease; Meningitis; Migraine; Muscle disorder; OSTEOPOROSIS; Parathyroid disorder (HCC); Pituitary disease (HCC); Seizure (HCC); Sickle cell disease (HCC); Stroke (HCC); Substance abuse (HCC); Thyroid disease; Tuberculosis; Ulcer; or Urinary tract infection, site not specified.  MEDS:   Current Outpatient Prescriptions:   •  Lidocaine-Hydrocortisone Ace 3-1 % Kit, Insert 1 Application  "in rectum 2 Times a Day. (Patient not taking: Reported on 3/25/2019), Disp: 1 Kit, Rfl: 1  •  docusate sodium (COLACE) 50 MG Cap, Take 2 Caps by mouth 2 times a day. (Patient not taking: Reported on 3/25/2019), Disp: 20 Cap, Rfl: 0  •  hydrOXYzine pamoate (VISTARIL) 25 MG Cap, Take 1 Cap by mouth every 8 hours as needed for Anxiety (May cause sedation). (Patient not taking: Reported on 3/25/2019), Disp: 28 Cap, Rfl: 0  •  doxycycline (VIBRAMYCIN) 100 MG Cap, Take 1 Cap by mouth 2 times a day. (Patient not taking: Reported on 3/25/2019), Disp: 14 Cap, Rfl: 0  •  fluticasone (FLONASE) 50 MCG/ACT nasal spray, Spray 2 Sprays in nose every day. (Patient not taking: Reported on 3/25/2019), Disp: 16 g, Rfl: 3  •  pantoprazole (PROTONIX) 40 MG TBEC, Take 1 Tab by mouth every day. (Patient not taking: Reported on 3/25/2019), Disp: 30 Tab, Rfl: 0  ALLERGIES:   Allergies   Allergen Reactions   • Penicillins Rash     Pt states \"I get a rash all over my body\".     SURGHX:   Past Surgical History:   Procedure Laterality Date   • CHOLECYSTECTOMY  2000     SOCHX:  reports that she has never smoked. She has never used smokeless tobacco. She reports that she does not drink alcohol or use drugs.  FH: Family history was reviewed, not contributing to acute illness     Objective:   /84 (BP Location: Left arm, Patient Position: Sitting, BP Cuff Size: Large adult)   Pulse 80   Temp 36.7 °C (98 °F)   Resp 16   Wt 93 kg (205 lb)   SpO2 96%   BMI 42.11 kg/m²     Physical Exam   Constitutional: She appears well-developed and well-nourished. No distress.   HENT:   Head: Normocephalic and atraumatic.   Right Ear: External ear and ear canal normal.   Left Ear: Tympanic membrane, external ear and ear canal normal.   Nose: Mucosal edema and rhinorrhea present.   Mouth/Throat: Uvula is midline and mucous membranes are normal.   Tonsils 3+, no tonsillar erythema, no tonsillar exudate  Right tympanic membrane clear with purulent effusion " present   Eyes: Pupils are equal, round, and reactive to light. Conjunctivae and EOM are normal. Right eye exhibits no discharge. Left eye exhibits no discharge. No scleral icterus.   Neck: Normal range of motion. No tracheal deviation present.   Cardiovascular: Normal rate and regular rhythm.    Pulmonary/Chest: Effort normal and breath sounds normal. No respiratory distress. She has no wheezes.   Rhonchi and rails in bilateral upper lung fields    Neurological: She is alert. Coordination normal.   Skin: Skin is warm and dry. No rash noted. She is not diaphoretic.   Psychiatric: She has a normal mood and affect. Her behavior is normal. Judgment and thought content normal.     Assessment/Plan:   Assessment    1. Lower respiratory infection  2.  Right otitis media  Patient is a 43-year-old female with cough, sore throat, and right ear pain for the past 3 weeks.  Has some rhonchi and rales in bilateral upper lung fields so a chest x-ray was ordered.  Chest x-ray clear and did not show acute infiltrate.  Patient is allergic to penicillins.  Will treat with doxycycline and follow-up with PCP if not fully resolved at the end of antibiotics.  - DX-CHEST-2 VIEWS; Future  - doxycycline (VIBRAMYCIN) 100 MG Tab; Take 1 Tab by mouth 2 times a day.  Dispense: 14 Tab; Refill: 0  - POCT Rapid Strep A    3. Missed menses  - POCT PREGNANCY

## 2021-09-02 ENCOUNTER — HOSPITAL ENCOUNTER (OUTPATIENT)
Dept: RADIOLOGY | Facility: MEDICAL CENTER | Age: 46
End: 2021-09-02
Attending: NURSE PRACTITIONER
Payer: COMMERCIAL

## 2021-09-02 DIAGNOSIS — R22.1 NECK FULLNESS: ICD-10-CM

## 2022-10-12 ENCOUNTER — HOSPITAL ENCOUNTER (OUTPATIENT)
Dept: RADIOLOGY | Facility: MEDICAL CENTER | Age: 47
End: 2022-10-12
Attending: NURSE PRACTITIONER
Payer: COMMERCIAL

## 2022-10-12 DIAGNOSIS — R10.12 CHRONIC LEFT UPPER QUADRANT PAIN: ICD-10-CM

## 2022-10-12 DIAGNOSIS — G89.29 CHRONIC LEFT UPPER QUADRANT PAIN: ICD-10-CM

## 2023-02-06 ENCOUNTER — OFFICE VISIT (OUTPATIENT)
Dept: URGENT CARE | Facility: PHYSICIAN GROUP | Age: 48
End: 2023-02-06
Payer: COMMERCIAL

## 2023-02-06 ENCOUNTER — APPOINTMENT (OUTPATIENT)
Dept: URGENT CARE | Facility: PHYSICIAN GROUP | Age: 48
End: 2023-02-06

## 2023-02-06 VITALS
WEIGHT: 185 LBS | BODY MASS INDEX: 36.32 KG/M2 | OXYGEN SATURATION: 98 % | DIASTOLIC BLOOD PRESSURE: 80 MMHG | RESPIRATION RATE: 16 BRPM | HEIGHT: 60 IN | TEMPERATURE: 98.5 F | HEART RATE: 78 BPM | SYSTOLIC BLOOD PRESSURE: 142 MMHG

## 2023-02-06 DIAGNOSIS — K11.20 PAROTIDITIS: ICD-10-CM

## 2023-02-06 PROCEDURE — 99203 OFFICE O/P NEW LOW 30 MIN: CPT | Performed by: NURSE PRACTITIONER

## 2023-02-06 RX ORDER — CEFUROXIME AXETIL 500 MG/1
500 TABLET ORAL 2 TIMES DAILY
Qty: 10 TABLET | Refills: 0 | Status: SHIPPED | OUTPATIENT
Start: 2023-02-06 | End: 2023-02-11

## 2023-02-06 RX ORDER — PREDNISONE 20 MG/1
20 TABLET ORAL DAILY
Qty: 3 TABLET | Refills: 0 | Status: SHIPPED | OUTPATIENT
Start: 2023-02-06 | End: 2023-02-09

## 2023-02-06 ASSESSMENT — FIBROSIS 4 INDEX: FIB4 SCORE: 1.25

## 2023-02-07 NOTE — PROGRESS NOTES
"Subjective:     Mavis Davison is a 47 y.o. female who presents for Otalgia (Left ear pain, x1 week )      Otalgia     Pt presents for evaluation of a new problem.  Kate is a very pleasant 47-year-old female presents to urgent care today with complaints of right-sided ear pain that has been ongoing for the past 1 week.  She does complain of tinnitus, decreased hearing and pain that radiates across to her left side of her head and under her jaw.  She does complain of pain with clenching her teeth and opening her mouth.  She denies sore throat, fever, chills or cough.  She notes that she has been slightly congested recently.  She has not used any medication for further relief of her pain.  Her pain is rated as severe and notes she would rated this as a 9/10.  Her pain is constant.  She notes that her throat is not painful but her mouth has been very dry as of recently.    Review of Systems   HENT:  Positive for ear pain.      PMH:   Past Medical History:   Diagnosis Date    GDM (gestational diabetes mellitus) 3/8/2012     ALLERGIES:   Allergies   Allergen Reactions    Penicillins Rash     Pt states \"I get a rash all over my body\".     SURGHX:   Past Surgical History:   Procedure Laterality Date    CHOLECYSTECTOMY  2000     SOCHX:   Social History     Socioeconomic History    Marital status:    Tobacco Use    Smoking status: Never    Smokeless tobacco: Never   Substance and Sexual Activity    Alcohol use: No    Drug use: No    Sexual activity: Not Currently     Partners: Male     FH: History reviewed. No pertinent family history.      Objective:   BP (!) 142/80 (BP Location: Right arm, Patient Position: Sitting, BP Cuff Size: Adult)   Pulse 78   Temp 36.9 °C (98.5 °F) (Temporal)   Resp 16   Ht 1.524 m (5')   Wt 83.9 kg (185 lb)   SpO2 98%   BMI 36.13 kg/m²     Physical Exam  Vitals and nursing note reviewed.   Constitutional:       General: She is not in acute distress.     Appearance: " Normal appearance. She is not ill-appearing.   HENT:      Head: Normocephalic and atraumatic.      Right Ear: External ear normal.      Left Ear: Tenderness present. No swelling. There is mastoid tenderness. Tympanic membrane is not injected, perforated or erythematous.      Ears:        Comments: There is no evidence of acute otitis media.  She does exhibit pain at her parotid gland and in her posterior ear.     Nose: No congestion or rhinorrhea.      Mouth/Throat:      Mouth: Mucous membranes are moist.   Eyes:      Extraocular Movements: Extraocular movements intact.      Pupils: Pupils are equal, round, and reactive to light.   Cardiovascular:      Rate and Rhythm: Normal rate and regular rhythm.      Pulses: Normal pulses.      Heart sounds: Normal heart sounds.   Pulmonary:      Effort: Pulmonary effort is normal.      Breath sounds: Normal breath sounds.   Abdominal:      General: Abdomen is flat. Bowel sounds are normal.      Palpations: Abdomen is soft.   Musculoskeletal:         General: Normal range of motion.      Cervical back: Normal range of motion and neck supple.   Skin:     General: Skin is warm and dry.      Capillary Refill: Capillary refill takes less than 2 seconds.   Neurological:      General: No focal deficit present.      Mental Status: She is alert and oriented to person, place, and time. Mental status is at baseline.   Psychiatric:         Mood and Affect: Mood normal.         Behavior: Behavior normal.         Thought Content: Thought content normal.         Judgment: Judgment normal.       Assessment/Plan:   Assessment    1. Parotiditis  cefUROXime (CEFTIN) 500 MG Tab    predniSONE (DELTASONE) 20 MG Tab        Differential diagnoses discussed with the patient.  At this time I do not believe that she is suffering from acute otitis media but it is likely she is suffering from parotiditis versus Sialadenitis.  Other differential to include herpes zoster as her pain does radiate to left  scalp however, there are no suspicious rash or lesions present at this time.  Due to her ongoing severe pain and concern for parotiditis she was started on Ceftin twice daily x5 days.  Prednisone was also prescribed for relief of inflammation and pain.  I did encourage her to suck on sour candy due to dry mouth and concern for blocked salivary gland.  She does have a known history of penicillin however, states that she only develops a rash with this medication.  Patient to stop use of Ceftin if side effects occur.  She is in agreement with this plan of care today.   AVS handout given and reviewed with patient. Pt educated on red flags and when to seek treatment back in ER or UC.

## 2023-03-15 ENCOUNTER — OFFICE VISIT (OUTPATIENT)
Dept: URGENT CARE | Facility: PHYSICIAN GROUP | Age: 48
End: 2023-03-15
Payer: COMMERCIAL

## 2023-03-15 VITALS
RESPIRATION RATE: 20 BRPM | BODY MASS INDEX: 37.66 KG/M2 | DIASTOLIC BLOOD PRESSURE: 68 MMHG | TEMPERATURE: 98.5 F | HEIGHT: 60 IN | HEART RATE: 76 BPM | WEIGHT: 191.8 LBS | OXYGEN SATURATION: 97 % | SYSTOLIC BLOOD PRESSURE: 156 MMHG

## 2023-03-15 DIAGNOSIS — J02.9 PHARYNGITIS, UNSPECIFIED ETIOLOGY: ICD-10-CM

## 2023-03-15 DIAGNOSIS — R05.1 ACUTE COUGH: ICD-10-CM

## 2023-03-15 LAB
FLUAV RNA SPEC QL NAA+PROBE: NEGATIVE
FLUBV RNA SPEC QL NAA+PROBE: NEGATIVE
RSV RNA SPEC QL NAA+PROBE: NEGATIVE
S PYO DNA SPEC NAA+PROBE: NOT DETECTED
SARS-COV-2 RNA RESP QL NAA+PROBE: NEGATIVE

## 2023-03-15 PROCEDURE — 99213 OFFICE O/P EST LOW 20 MIN: CPT | Performed by: FAMILY MEDICINE

## 2023-03-15 PROCEDURE — 0241U POCT CEPHEID COV-2, FLU A/B, RSV - PCR: CPT | Performed by: FAMILY MEDICINE

## 2023-03-15 PROCEDURE — 87651 STREP A DNA AMP PROBE: CPT | Performed by: FAMILY MEDICINE

## 2023-03-15 RX ORDER — ROSUVASTATIN CALCIUM 10 MG/1
10 TABLET, COATED ORAL DAILY
COMMUNITY
Start: 2023-02-15

## 2023-03-15 RX ORDER — METFORMIN HYDROCHLORIDE 500 MG/1
1000 TABLET, EXTENDED RELEASE ORAL 2 TIMES DAILY
COMMUNITY
Start: 2023-02-15

## 2023-03-15 RX ORDER — DEXTROMETHORPHAN HYDROBROMIDE AND PROMETHAZINE HYDROCHLORIDE 15; 6.25 MG/5ML; MG/5ML
5 SYRUP ORAL 4 TIMES DAILY PRN
Qty: 120 ML | Refills: 0 | Status: SHIPPED | OUTPATIENT
Start: 2023-03-15 | End: 2023-09-01

## 2023-03-15 RX ORDER — PIOGLITAZONEHYDROCHLORIDE 45 MG/1
45 TABLET ORAL DAILY
COMMUNITY
Start: 2023-02-15

## 2023-03-15 RX ORDER — LIDOCAINE HYDROCHLORIDE 20 MG/ML
15 SOLUTION OROPHARYNGEAL EVERY 4 HOURS PRN
Qty: 120 ML | Refills: 0 | Status: SHIPPED | OUTPATIENT
Start: 2023-03-15 | End: 2023-09-01

## 2023-03-15 RX ORDER — EMPAGLIFLOZIN 25 MG/1
1 TABLET, FILM COATED ORAL DAILY
COMMUNITY
Start: 2023-02-15

## 2023-03-15 ASSESSMENT — ENCOUNTER SYMPTOMS
EYE DISCHARGE: 0
WEIGHT LOSS: 0
EYE REDNESS: 0
HEADACHES: 1
MYALGIAS: 0
DIARRHEA: 0
VOMITING: 0
NAUSEA: 0

## 2023-03-15 ASSESSMENT — FIBROSIS 4 INDEX: FIB4 SCORE: 1.25

## 2023-03-15 NOTE — PROGRESS NOTES
Subjective     Mavis Davison is a 47 y.o. female who presents with Headache (Dry throat, bilateral ear pain, sinus pain, x2 days )            2 days ST, productive cough without blood in sputum. Sinus pressure and earache. No fever. Tolerating fluids with normal urine output. No SOB/wheeze. No other aggravating or alleviating factors.        Review of Systems   Constitutional:  Negative for malaise/fatigue and weight loss.   Eyes:  Negative for discharge and redness.   Gastrointestinal:  Negative for diarrhea, nausea and vomiting.   Musculoskeletal:  Negative for joint pain and myalgias.   Skin:  Negative for itching and rash.   Neurological:  Positive for headaches.            Objective     BP (!) 156/68 (BP Location: Right arm, Patient Position: Sitting, BP Cuff Size: Adult)   Pulse 76   Temp 36.9 °C (98.5 °F) (Temporal)   Resp 20   Ht 1.524 m (5')   Wt 87 kg (191 lb 12.8 oz)   SpO2 97%   BMI 37.46 kg/m²      Physical Exam  Constitutional:       General: She is not in acute distress.     Appearance: She is well-developed.   HENT:      Head: Normocephalic and atraumatic.      Right Ear: Tympanic membrane normal.      Left Ear: Tympanic membrane normal.      Nose: Congestion present.      Mouth/Throat:      Mouth: Mucous membranes are moist.      Pharynx: Posterior oropharyngeal erythema present.   Eyes:      Conjunctiva/sclera: Conjunctivae normal.   Cardiovascular:      Rate and Rhythm: Normal rate and regular rhythm.      Heart sounds: Normal heart sounds. No murmur heard.  Pulmonary:      Effort: Pulmonary effort is normal.      Breath sounds: Normal breath sounds. No wheezing.   Musculoskeletal:      Cervical back: Neck supple.   Lymphadenopathy:      Cervical: No cervical adenopathy.   Skin:     General: Skin is warm and dry.      Findings: No rash.   Neurological:      Mental Status: She is alert.                           Assessment & Plan   POCT PCR strep negative  POCT PCR respiratory  panel negative     1. Acute cough  POCT CoV-2, Flu A/B, RSV by PCR    promethazine-dextromethorphan (PROMETHAZINE-DM) 6.25-15 MG/5ML syrup      2. Pharyngitis, unspecified etiology  POCT GROUP A STREP, PCR    POCT CoV-2, Flu A/B, RSV by PCR    lidocaine (XYLOCAINE) 2 % Solution        Differential diagnosis, natural history, supportive care, and indications for immediate follow-up were discussed.

## 2023-09-01 ENCOUNTER — OFFICE VISIT (OUTPATIENT)
Dept: URGENT CARE | Facility: PHYSICIAN GROUP | Age: 48
End: 2023-09-01

## 2023-09-01 VITALS
TEMPERATURE: 97.9 F | HEIGHT: 63 IN | WEIGHT: 185 LBS | HEART RATE: 85 BPM | SYSTOLIC BLOOD PRESSURE: 140 MMHG | BODY MASS INDEX: 32.78 KG/M2 | OXYGEN SATURATION: 96 % | DIASTOLIC BLOOD PRESSURE: 80 MMHG | RESPIRATION RATE: 16 BRPM

## 2023-09-01 DIAGNOSIS — U07.1 COVID-19: ICD-10-CM

## 2023-09-01 DIAGNOSIS — J02.9 SORE THROAT: ICD-10-CM

## 2023-09-01 LAB
FLUAV RNA SPEC QL NAA+PROBE: NEGATIVE
FLUBV RNA SPEC QL NAA+PROBE: NEGATIVE
RSV RNA SPEC QL NAA+PROBE: NEGATIVE
S PYO DNA SPEC NAA+PROBE: NOT DETECTED
SARS-COV-2 RNA RESP QL NAA+PROBE: POSITIVE

## 2023-09-01 PROCEDURE — 1125F AMNT PAIN NOTED PAIN PRSNT: CPT

## 2023-09-01 PROCEDURE — 87651 STREP A DNA AMP PROBE: CPT

## 2023-09-01 PROCEDURE — 3079F DIAST BP 80-89 MM HG: CPT

## 2023-09-01 PROCEDURE — 0241U POCT CEPHEID COV-2, FLU A/B, RSV - PCR: CPT

## 2023-09-01 PROCEDURE — 99213 OFFICE O/P EST LOW 20 MIN: CPT

## 2023-09-01 PROCEDURE — 3077F SYST BP >= 140 MM HG: CPT

## 2023-09-01 RX ORDER — LIDOCAINE HYDROCHLORIDE 20 MG/ML
15 SOLUTION OROPHARYNGEAL PRN
Qty: 100 ML | Refills: 0 | Status: SHIPPED | OUTPATIENT
Start: 2023-09-01

## 2023-09-01 RX ORDER — BENZONATATE 100 MG/1
100 CAPSULE ORAL 3 TIMES DAILY PRN
Qty: 60 CAPSULE | Refills: 0 | Status: SHIPPED | OUTPATIENT
Start: 2023-09-01

## 2023-09-01 RX ORDER — DEXTROMETHORPHAN HYDROBROMIDE AND PROMETHAZINE HYDROCHLORIDE 15; 6.25 MG/5ML; MG/5ML
5 SYRUP ORAL EVERY 6 HOURS PRN
Qty: 118 ML | Refills: 0 | Status: SHIPPED | OUTPATIENT
Start: 2023-09-01 | End: 2023-09-08

## 2023-09-01 ASSESSMENT — ENCOUNTER SYMPTOMS
HEADACHES: 1
FEVER: 0
COUGH: 1
SORE THROAT: 1
MYALGIAS: 1

## 2023-09-01 ASSESSMENT — PAIN SCALES - GENERAL: PAINLEVEL: 6=MODERATE PAIN

## 2023-09-01 NOTE — PROGRESS NOTES
"Subjective:     CHIEF COMPLAINT  Chief Complaint   Patient presents with    Cough     X 2 days     Body Aches     X 2 days    Headache     X 2 days     Fatigue     X 2 days        HPI  Mavis Davison is a very pleasant 48 y.o. female who presents with a sore throat, cough, body aches, and headache for the past 2 days.  Her body aches during the night have been interfering with her sleep.  She has additionally been experiencing discomfort in her right ear.  She has not had any known fevers, although she has not taken her temperature.  She has been able to tolerate food and liquids.    REVIEW OF SYSTEMS  Review of Systems   Constitutional:  Positive for malaise/fatigue. Negative for fever.   HENT:  Positive for ear pain (R) and sore throat.    Respiratory:  Positive for cough.    Musculoskeletal:  Positive for myalgias.   Neurological:  Positive for headaches.       PAST MEDICAL HISTORY  Patient Active Problem List    Diagnosis Date Noted    Biliary colic 09/06/2016    Chest pain 07/22/2015    Postpartum care and examination of lactating mother 06/11/2012    HEMORRHOIDS 06/11/2012    H. pylori  11/23/2011       SURGICAL HISTORY   has a past surgical history that includes cholecystectomy (2000).    ALLERGIES  Allergies   Allergen Reactions    Penicillins Rash     Pt states \"I get a rash all over my body\".       CURRENT MEDICATIONS  Home Medications       Reviewed by Yuridia Echavarria P.A.-C. (Physician Assistant) on 09/01/23 at 0911  Med List Status: <None>     Medication Last Dose Status   docusate sodium (COLACE) 50 MG Cap Taking Active   fluticasone (FLONASE) 50 MCG/ACT nasal spray Taking Active   hydrOXYzine pamoate (VISTARIL) 25 MG Cap Taking Active   JARDIANCE 25 MG Tab Taking Active   lidocaine (XYLOCAINE) 2 % Solution  Active   Lidocaine-Hydrocortisone Ace 3-1 % Kit Taking Active   metFORMIN ER (GLUCOPHAGE XR) 500 MG TABLET SR 24 HR Taking Active   METFORMIN HCL ER PO Taking Active   pantoprazole " "(PROTONIX) 40 MG TBEC Taking Active   pioglitazone (ACTOS) 45 MG Tab Taking Active   promethazine-dextromethorphan (PROMETHAZINE-DM) 6.25-15 MG/5ML syrup  Active   rosuvastatin (CRESTOR) 10 MG Tab Taking Active   ROSUVASTATIN CALCIUM PO Taking Active                    SOCIAL HISTORY  Social History     Tobacco Use    Smoking status: Never    Smokeless tobacco: Never   Substance and Sexual Activity    Alcohol use: No    Drug use: No    Sexual activity: Not Currently     Partners: Male       FAMILY HISTORY  History reviewed. No pertinent family history.       Objective:     VITAL SIGNS: BP (!) 140/80 (BP Location: Right arm, Patient Position: Sitting, BP Cuff Size: Adult)   Pulse 85   Temp 36.6 °C (97.9 °F) (Temporal)   Resp 16   Ht 1.6 m (5' 3\")   Wt 83.9 kg (185 lb)   SpO2 96%   BMI 32.77 kg/m²     PHYSICAL EXAM  Physical Exam  Vitals reviewed.   Constitutional:       General: She is not in acute distress.     Appearance: Normal appearance. She is not ill-appearing, toxic-appearing or diaphoretic.   HENT:      Head: Normocephalic and atraumatic.      Right Ear: Tympanic membrane, ear canal and external ear normal.      Left Ear: Tympanic membrane, ear canal and external ear normal.      Nose: Nose normal.      Mouth/Throat:      Mouth: Mucous membranes are moist.      Pharynx: Oropharynx is clear. Posterior oropharyngeal erythema present. No oropharyngeal exudate.      Comments: Uvula midline.  Tonsils 3+ bilaterally.  Pharyngeal erythema present.  Eyes:      Extraocular Movements: Extraocular movements intact.      Conjunctiva/sclera: Conjunctivae normal.      Pupils: Pupils are equal, round, and reactive to light.   Cardiovascular:      Rate and Rhythm: Normal rate and regular rhythm.      Heart sounds: Normal heart sounds.   Pulmonary:      Effort: Pulmonary effort is normal. No respiratory distress.      Breath sounds: No stridor. No wheezing, rhonchi or rales.   Musculoskeletal:         General: Normal " range of motion.      Cervical back: Normal range of motion.   Skin:     General: Skin is warm and dry.   Neurological:      Mental Status: She is alert and oriented to person, place, and time.   Psychiatric:         Mood and Affect: Mood normal.       Assessment/Plan:     1. COVID-19  - POCT CEPHEID COV-2, FLU A/B, RSV - PCR  - benzonatate (TESSALON) 100 MG Cap; Take 1 Capsule by mouth 3 times a day as needed for Cough.  Dispense: 60 Capsule; Refill: 0  - promethazine-dextromethorphan (PROMETHAZINE-DM) 6.25-15 MG/5ML syrup; Take 5 mL by mouth every 6 hours as needed for Cough for up to 7 days. (caution: may cause sedation)  Dispense: 118 mL; Refill: 0    2. Sore throat  - POCT CEPHEID GROUP A STREP - PCR  - lidocaine (XYLOCAINE) 2 % Solution; Take 15 mL by mouth as needed for Throat/Mouth Pain (Swish, gargle, and spit up to 5 times daily for sore throat).  Dispense: 100 mL; Refill: 0  -Rest and hydrate  -Tylenol/ibuprofen OTC as needed for pain  -Warm salt water gargles for sore throat  -Return to clinic if symptoms worsen or fail to resolve      MDM/Comments:  Patient has stable vital signs and is non-toxic appearing. Discussed supportive care measures with warm salt water gargles, rest, tylenol/ibuprofen OTC as needed for pain, and maintaining adequate hydration.  Lidocaine mouthwash provided to patient for sore throat.  Promethazine DM provided for cough maintenance at night.  Benzonatate given to be taken during the day for cough.  Kinyarwanda speaking video  used to facilitate this visit.  Strep and COVID Cepheid testing performed in office with positive COVID results.  Medical assistant called the patient and informed of positive COVID.  Isolation protocols discussed.  Patient offered antiviral medications such as Paxlovid or legit frail, but she politely declined.  Patient demonstrated understanding of treatment plan and agreed to return to the clinic if symptoms worsen or fail to resolve.      Differential diagnosis, natural history, supportive care, and indications for immediate follow-up discussed. All questions answered. Patient agrees with the plan of care.    Follow-up as needed if symptoms worsen or fail to improve to PCP, Urgent care or Emergency Room.    I have personally reviewed prior external notes and test results pertinent to today's visit.  I have independently reviewed and interpreted all diagnostics ordered during this urgent care acute visit.   Discussed management options (risks,benefits, and alternatives to treatment). Pt expresses understanding and the treatment plan was agreed upon. Questions were encouraged and answered to pt's satisfaction.    Please note that this dictation was created using voice recognition software. I have made a reasonable attempt to correct obvious errors, but I expect that there are errors of grammar and possibly content that I did not discover before finalizing the note.

## 2023-09-03 ENCOUNTER — OFFICE VISIT (OUTPATIENT)
Dept: URGENT CARE | Facility: PHYSICIAN GROUP | Age: 48
End: 2023-09-03

## 2023-09-03 VITALS
DIASTOLIC BLOOD PRESSURE: 80 MMHG | SYSTOLIC BLOOD PRESSURE: 136 MMHG | OXYGEN SATURATION: 96 % | HEIGHT: 63 IN | RESPIRATION RATE: 16 BRPM | TEMPERATURE: 97.4 F | BODY MASS INDEX: 32.78 KG/M2 | WEIGHT: 185 LBS | HEART RATE: 91 BPM

## 2023-09-03 DIAGNOSIS — J02.9 SORE THROAT: ICD-10-CM

## 2023-09-03 DIAGNOSIS — U07.1 COVID-19: ICD-10-CM

## 2023-09-03 LAB — S PYO DNA SPEC NAA+PROBE: NOT DETECTED

## 2023-09-03 PROCEDURE — 87651 STREP A DNA AMP PROBE: CPT | Performed by: NURSE PRACTITIONER

## 2023-09-03 PROCEDURE — 3075F SYST BP GE 130 - 139MM HG: CPT | Performed by: NURSE PRACTITIONER

## 2023-09-03 PROCEDURE — 99214 OFFICE O/P EST MOD 30 MIN: CPT | Performed by: NURSE PRACTITIONER

## 2023-09-03 PROCEDURE — 3079F DIAST BP 80-89 MM HG: CPT | Performed by: NURSE PRACTITIONER

## 2023-09-03 ASSESSMENT — ENCOUNTER SYMPTOMS
NAUSEA: 0
HEADACHES: 0
SORE THROAT: 1
DIARRHEA: 0
FEVER: 0
COUGH: 1
MYALGIAS: 0
DIZZINESS: 0
CHILLS: 0

## 2023-09-03 NOTE — PROGRESS NOTES
Subjective     Mavis Davison is a 48 y.o. female who presents with Sore Throat (Hard to swallow , cough, pt positive for covid x1 week ago )            HPI  Recurrent problem.  Patient is a 48-year-old female who presents with sore throat, nasal congestion, and cough for several days.  She was apparently here on the first and tested positive for COVID-19 and at that time had a negative strep test.  Her sore throat is worsened significantly this morning making it difficult for her to swallow.  She denies any more fever or body aches however continues to have congestion and cough.  She would like to be reswabbed for strep.  She is drinking hot tea and taking over-the-counter medications as well as Tessalon Perles.  Per her chart she declined Paxlovid for treatment of her COVID    Penicillins  Current Outpatient Medications on File Prior to Visit   Medication Sig Dispense Refill    benzonatate (TESSALON) 100 MG Cap Take 1 Capsule by mouth 3 times a day as needed for Cough. 60 Capsule 0    promethazine-dextromethorphan (PROMETHAZINE-DM) 6.25-15 MG/5ML syrup Take 5 mL by mouth every 6 hours as needed for Cough for up to 7 days. (caution: may cause sedation) 118 mL 0    lidocaine (XYLOCAINE) 2 % Solution Take 15 mL by mouth as needed for Throat/Mouth Pain (Swish, gargle, and spit up to 5 times daily for sore throat). 100 mL 0    JARDIANCE 25 MG Tab Take 1 Tablet by mouth every day.      metFORMIN ER (GLUCOPHAGE XR) 500 MG TABLET SR 24 HR Take 1,000 mg by mouth 2 times a day.      pioglitazone (ACTOS) 45 MG Tab Take 45 mg by mouth every day.      rosuvastatin (CRESTOR) 10 MG Tab Take 10 mg by mouth every day.      METFORMIN HCL ER PO Take  by mouth.      ROSUVASTATIN CALCIUM PO Take  by mouth.      Lidocaine-Hydrocortisone Ace 3-1 % Kit Insert 1 Application in rectum 2 Times a Day. 1 Kit 1    docusate sodium (COLACE) 50 MG Cap Take 2 Caps by mouth 2 times a day. 20 Cap 0    hydrOXYzine pamoate (VISTARIL) 25 MG  "Cap Take 1 Cap by mouth every 8 hours as needed for Anxiety (May cause sedation). 28 Cap 0    fluticasone (FLONASE) 50 MCG/ACT nasal spray Spray 2 Sprays in nose every day. 16 g 3    pantoprazole (PROTONIX) 40 MG TBEC Take 1 Tab by mouth every day. 30 Tab 0     No current facility-administered medications on file prior to visit.     Social History     Socioeconomic History    Marital status:      Spouse name: Not on file    Number of children: Not on file    Years of education: Not on file    Highest education level: Not on file   Occupational History    Not on file   Tobacco Use    Smoking status: Never    Smokeless tobacco: Never   Substance and Sexual Activity    Alcohol use: No    Drug use: No    Sexual activity: Not Currently     Partners: Male   Other Topics Concern    Not on file   Social History Narrative    Not on file     Social Determinants of Health     Financial Resource Strain: Not on file   Food Insecurity: Not on file   Transportation Needs: Not on file   Physical Activity: Not on file   Stress: Not on file   Social Connections: Not on file   Intimate Partner Violence: Not on file   Housing Stability: Not on file     Breast Cancer-related family history is not on file.      Review of Systems   Constitutional:  Positive for malaise/fatigue. Negative for chills and fever.   HENT:  Positive for congestion and sore throat.    Respiratory:  Positive for cough.    Gastrointestinal:  Negative for diarrhea and nausea.   Musculoskeletal:  Negative for myalgias.   Neurological:  Negative for dizziness and headaches.              Objective     /80 (BP Location: Right arm, Patient Position: Sitting, BP Cuff Size: Adult)   Pulse 91   Temp 36.3 °C (97.4 °F) (Temporal)   Resp 16   Ht 1.6 m (5' 3\")   Wt 83.9 kg (185 lb)   SpO2 96%   BMI 32.77 kg/m²      Physical Exam  Vitals and nursing note reviewed.   Constitutional:       General: She is not in acute distress.     Appearance: She is " well-developed.   HENT:      Head: Normocephalic.      Right Ear: Tympanic membrane and external ear normal.      Left Ear: Tympanic membrane and external ear normal.      Nose: Mucosal edema, congestion and rhinorrhea present.      Mouth/Throat:      Pharynx: No posterior oropharyngeal erythema.   Eyes:      General:         Right eye: No discharge.         Left eye: No discharge.      Conjunctiva/sclera: Conjunctivae normal.   Cardiovascular:      Rate and Rhythm: Normal rate and regular rhythm.      Heart sounds: Normal heart sounds.   Pulmonary:      Effort: Pulmonary effort is normal.      Breath sounds: Normal breath sounds.   Musculoskeletal:         General: Normal range of motion.      Cervical back: Normal range of motion and neck supple.   Lymphadenopathy:      Cervical: No cervical adenopathy.   Skin:     General: Skin is warm and dry.   Neurological:      Mental Status: She is alert and oriented to person, place, and time.   Psychiatric:         Behavior: Behavior normal.         Thought Content: Thought content normal.                             Assessment & Plan        1. COVID-19  Nirmatrelvir&Ritonavir 300/100 20 x 150 MG & 10 x 100MG Tablet Therapy Pack      2. Sore throat  POCT CEPHEID GROUP A STREP - PCR        Strep negative.  Reviewed symptomatic care with patient and daughter.  She has consented to paxlovid as she is still in window.  Differential diagnosis, natural history, supportive care, and indications for immediate follow-up were discussed.

## 2023-09-11 ENCOUNTER — OFFICE VISIT (OUTPATIENT)
Dept: URGENT CARE | Facility: PHYSICIAN GROUP | Age: 48
End: 2023-09-11

## 2023-09-11 ENCOUNTER — HOSPITAL ENCOUNTER (OUTPATIENT)
Facility: MEDICAL CENTER | Age: 48
End: 2023-09-11
Attending: NURSE PRACTITIONER
Payer: COMMERCIAL

## 2023-09-11 VITALS
SYSTOLIC BLOOD PRESSURE: 126 MMHG | TEMPERATURE: 97.4 F | HEART RATE: 83 BPM | BODY MASS INDEX: 34.78 KG/M2 | OXYGEN SATURATION: 95 % | HEIGHT: 62 IN | DIASTOLIC BLOOD PRESSURE: 80 MMHG | RESPIRATION RATE: 20 BRPM | WEIGHT: 189 LBS

## 2023-09-11 DIAGNOSIS — R30.0 DYSURIA: ICD-10-CM

## 2023-09-11 DIAGNOSIS — N89.8 VAGINAL IRRITATION: ICD-10-CM

## 2023-09-11 DIAGNOSIS — N30.01 ACUTE CYSTITIS WITH HEMATURIA: ICD-10-CM

## 2023-09-11 LAB
APPEARANCE UR: NORMAL
BILIRUB UR STRIP-MCNC: NEGATIVE MG/DL
COLOR UR AUTO: YELLOW
GLUCOSE UR STRIP.AUTO-MCNC: 1000 MG/DL
KETONES UR STRIP.AUTO-MCNC: NEGATIVE MG/DL
LEUKOCYTE ESTERASE UR QL STRIP.AUTO: NEGATIVE
NITRITE UR QL STRIP.AUTO: NEGATIVE
PH UR STRIP.AUTO: 6 [PH] (ref 5–8)
PROT UR QL STRIP: NEGATIVE MG/DL
RBC UR QL AUTO: NORMAL
SP GR UR STRIP.AUTO: 1.02
UROBILINOGEN UR STRIP-MCNC: 0.2 MG/DL

## 2023-09-11 PROCEDURE — 87660 TRICHOMONAS VAGIN DIR PROBE: CPT

## 2023-09-11 PROCEDURE — 81002 URINALYSIS NONAUTO W/O SCOPE: CPT | Performed by: NURSE PRACTITIONER

## 2023-09-11 PROCEDURE — 87510 GARDNER VAG DNA DIR PROBE: CPT

## 2023-09-11 PROCEDURE — 87086 URINE CULTURE/COLONY COUNT: CPT

## 2023-09-11 PROCEDURE — 87186 SC STD MICRODIL/AGAR DIL: CPT

## 2023-09-11 PROCEDURE — 3079F DIAST BP 80-89 MM HG: CPT | Performed by: NURSE PRACTITIONER

## 2023-09-11 PROCEDURE — 87480 CANDIDA DNA DIR PROBE: CPT

## 2023-09-11 PROCEDURE — 3074F SYST BP LT 130 MM HG: CPT | Performed by: NURSE PRACTITIONER

## 2023-09-11 PROCEDURE — 99213 OFFICE O/P EST LOW 20 MIN: CPT | Performed by: NURSE PRACTITIONER

## 2023-09-11 PROCEDURE — 87077 CULTURE AEROBIC IDENTIFY: CPT

## 2023-09-11 RX ORDER — NITROFURANTOIN 25; 75 MG/1; MG/1
100 CAPSULE ORAL 2 TIMES DAILY
Qty: 10 CAPSULE | Refills: 0 | Status: SHIPPED | OUTPATIENT
Start: 2023-09-11 | End: 2023-09-16

## 2023-09-11 NOTE — PROGRESS NOTES
Date: 09/11/23     Chief Complaint:    Chief Complaint   Patient presents with    UTI     Painful urination,burning,x3 days        History of Present Illness: 48 y.o.  female presents to clinic with 3-day history of burning with urination urinary frequency and urgency.  Patient also admits to a change in vaginal discharge and vaginal irritation and itching.  She denies possibility of STI STD.  She admits to pelvic pain although denies flank pain.  She denies any fevers or body aches.  Patient did recently recover from COVID-19.  She did take Paxlovid for her symptoms.  She states she still has a mild lingering cough although symptoms have improved.  She does have a history of diabetes of which she takes metformin and Jardiance.      ROS:    As stated in HPI     Medical/SX/ Social History:  Reviewed per chart    Pertinent Medications:    Current Outpatient Medications on File Prior to Visit   Medication Sig Dispense Refill    Nirmatrelvir&Ritonavir 300/100 20 x 150 MG & 10 x 100MG Tablet Therapy Pack Take 300 mg nirmatrelvir (two 150 mg tablets) with 100 mg ritonavir (one 100 mg tablet) by mouth, with all three tablets taken together twice daily for 5 days. 30 Each 0    benzonatate (TESSALON) 100 MG Cap Take 1 Capsule by mouth 3 times a day as needed for Cough. 60 Capsule 0    lidocaine (XYLOCAINE) 2 % Solution Take 15 mL by mouth as needed for Throat/Mouth Pain (Swish, gargle, and spit up to 5 times daily for sore throat). 100 mL 0    JARDIANCE 25 MG Tab Take 1 Tablet by mouth every day.      metFORMIN ER (GLUCOPHAGE XR) 500 MG TABLET SR 24 HR Take 1,000 mg by mouth 2 times a day.      pioglitazone (ACTOS) 45 MG Tab Take 45 mg by mouth every day.      rosuvastatin (CRESTOR) 10 MG Tab Take 10 mg by mouth every day.      Lidocaine-Hydrocortisone Ace 3-1 % Kit Insert 1 Application in rectum 2 Times a Day. 1 Kit 1    docusate sodium (COLACE) 50 MG Cap Take 2 Caps by mouth 2 times a day. 20 Cap 0    hydrOXYzine pamoate  (VISTARIL) 25 MG Cap Take 1 Cap by mouth every 8 hours as needed for Anxiety (May cause sedation). 28 Cap 0    fluticasone (FLONASE) 50 MCG/ACT nasal spray Spray 2 Sprays in nose every day. 16 g 3    pantoprazole (PROTONIX) 40 MG TBEC Take 1 Tab by mouth every day. 30 Tab 0    METFORMIN HCL ER PO Take  by mouth. (Patient not taking: Reported on 9/11/2023)      ROSUVASTATIN CALCIUM PO Take  by mouth. (Patient not taking: Reported on 9/11/2023)       No current facility-administered medications on file prior to visit.        Allergies:    Penicillins     Problem list, medications, and allergies reviewed by myself today in Epic     Physical Exam:    Vitals:    09/11/23 1215   BP: 126/80   Pulse: 83   Resp: 20   Temp: 36.3 °C (97.4 °F)   SpO2: 95%             Physical Exam  Constitutional:       General: She is not in acute distress.     Appearance: Normal appearance. She is well-developed. She is not ill-appearing or toxic-appearing.   HENT:      Head: Normocephalic and atraumatic.   Cardiovascular:      Rate and Rhythm: Normal rate and regular rhythm.      Heart sounds: Normal heart sounds.   Pulmonary:      Effort: Pulmonary effort is normal. No respiratory distress.      Breath sounds: Normal breath sounds. No wheezing.   Abdominal:      General: Abdomen is flat. Bowel sounds are normal.      Palpations: Abdomen is soft.      Tenderness: There is no abdominal tenderness. There is no right CVA tenderness, left CVA tenderness, guarding or rebound.   Skin:     General: Skin is warm.      Capillary Refill: Capillary refill takes less than 2 seconds.      Coloration: Skin is not cyanotic or pale.   Neurological:      Mental Status: She is alert and oriented to person, place, and time.      Gait: Gait is intact.   Psychiatric:         Behavior: Behavior normal. Behavior is cooperative.                  Diagnostics:      Results for orders placed or performed in visit on 09/11/23   POCT Urinalysis   Result Value Ref Range     POC Color yellow Negative    POC Appearance cloudy Negative    POC Glucose 1,000 Negative mg/dL    POC Bilirubin negative Negative mg/dL    POC Ketones negative Negative mg/dL    POC Specific Gravity 1.025 <1.005 - >1.030    POC Blood trace-intact Negative    POC Urine PH 6.0 5.0 - 8.0    POC Protein negative Negative mg/dL    POC Urobiligen 0.2 Negative (0.2) mg/dL    POC Nitrites negative Negative    POC Leukocyte Esterase negative Negative       Diagnostics interpreted by myself.      Medical Decision making and plan :  I personally reviewed prior external notes and test results pertinent to today's visit. Pt is clinically stable at today's acute urgent care visit.  Patient appears nontoxic with no acute distress noted. Appropriate for outpatient care at this time. The patient remained stable during the urgent care visit.     Pleasant 48 y.o. female presented clinic with HPI and exam findings consistent with acute cystitis with hematuria.  Patient did have elevated glucose in her urine likely secondary to Jardiance.  Hematuria noted on urinalysis.  Will treat with Macrobid 5-day course urine culture pending.  Due to vaginal irritation did also obtain vaginal pathogen swab.  Advised patient that if the change in treatment is needed we will discuss her MyChart.  Patient did verbalize understanding agree with plan.  Shared decision-making was utilized with patient for treatment plan.  Differential Diagnosis, natural history, and supportive care discussed.      1. Acute cystitis with hematuria    - nitrofurantoin (MACROBID) 100 MG Cap; Take 1 Capsule by mouth 2 times a day for 5 days.  Dispense: 10 Capsule; Refill: 0    2. Dysuria    - POCT Urinalysis  - URINE CULTURE(NEW); Future    3. Vaginal irritation    - VAGINAL PATHOGENS DNA PANEL; Future        Medication discussed included indication for use and the potential benefits and side effects. Education was provided regarding the aforementioned assessments.  All  of the patient's questions were answered to their satisfaction at the time of discharge. Patient was encouraged to monitor symptoms closely. Those signs and symptoms which would warrant concern and mandate seeking a higher level of service through the emergency department discussed at length.  Patient stated agreement and understanding of this plan of care.    Disposition:  Home in stable condition       Voice Recognition Disclaimer:  Portions of this document were created using voice recognition software. The software does have a chance of producing errors of grammar and possibly content. I have made every reasonable attempt to correct obvious errors, but there may be errors of grammar and possibly content that I did not discover before finalizing the documentation.    KIMBERLYN Ramires.

## 2023-09-12 ENCOUNTER — TELEPHONE (OUTPATIENT)
Dept: URGENT CARE | Facility: CLINIC | Age: 48
End: 2023-09-12

## 2023-09-12 DIAGNOSIS — B37.31 VAGINAL YEAST INFECTION: ICD-10-CM

## 2023-09-12 LAB
CANDIDA DNA VAG QL PROBE+SIG AMP: POSITIVE
G VAGINALIS DNA VAG QL PROBE+SIG AMP: NEGATIVE
T VAGINALIS DNA VAG QL PROBE+SIG AMP: NEGATIVE

## 2023-09-12 RX ORDER — FLUCONAZOLE 150 MG/1
TABLET ORAL
Qty: 2 TABLET | Refills: 0 | Status: SHIPPED | OUTPATIENT
Start: 2023-09-12

## 2023-09-12 NOTE — TELEPHONE ENCOUNTER
Called and discussed vaginal pathogen swab with patient.  Family member did interpret did offer  services patient did decline.  Advised that patient does have a vaginal yeast infection I did send in Diflucan.  Patient had no further questions.

## 2023-09-14 LAB
BACTERIA UR CULT: ABNORMAL
BACTERIA UR CULT: ABNORMAL
SIGNIFICANT IND 70042: ABNORMAL
SITE SITE: ABNORMAL
SOURCE SOURCE: ABNORMAL

## 2024-01-09 ENCOUNTER — OFFICE VISIT (OUTPATIENT)
Dept: URGENT CARE | Facility: PHYSICIAN GROUP | Age: 49
End: 2024-01-09

## 2024-01-09 VITALS
DIASTOLIC BLOOD PRESSURE: 74 MMHG | SYSTOLIC BLOOD PRESSURE: 122 MMHG | WEIGHT: 189 LBS | TEMPERATURE: 97.4 F | RESPIRATION RATE: 20 BRPM | HEART RATE: 81 BPM | OXYGEN SATURATION: 96 % | HEIGHT: 62 IN | BODY MASS INDEX: 34.78 KG/M2

## 2024-01-09 DIAGNOSIS — R39.9 UTI SYMPTOMS: ICD-10-CM

## 2024-01-09 DIAGNOSIS — R30.0 DYSURIA: ICD-10-CM

## 2024-01-09 LAB
APPEARANCE UR: NORMAL
BILIRUB UR STRIP-MCNC: NEGATIVE MG/DL
COLOR UR AUTO: NORMAL
GLUCOSE UR STRIP.AUTO-MCNC: 1000 MG/DL
KETONES UR STRIP.AUTO-MCNC: NORMAL MG/DL
LEUKOCYTE ESTERASE UR QL STRIP.AUTO: NEGATIVE
NITRITE UR QL STRIP.AUTO: NEGATIVE
PH UR STRIP.AUTO: 7 [PH] (ref 5–8)
PROT UR QL STRIP: NEGATIVE MG/DL
RBC UR QL AUTO: NORMAL
SP GR UR STRIP.AUTO: 1.01
UROBILINOGEN UR STRIP-MCNC: 0.2 MG/DL

## 2024-01-09 PROCEDURE — 3078F DIAST BP <80 MM HG: CPT | Performed by: PHYSICIAN ASSISTANT

## 2024-01-09 PROCEDURE — 99213 OFFICE O/P EST LOW 20 MIN: CPT | Performed by: PHYSICIAN ASSISTANT

## 2024-01-09 PROCEDURE — 3074F SYST BP LT 130 MM HG: CPT | Performed by: PHYSICIAN ASSISTANT

## 2024-01-09 PROCEDURE — 81002 URINALYSIS NONAUTO W/O SCOPE: CPT | Performed by: PHYSICIAN ASSISTANT

## 2024-01-09 RX ORDER — NITROFURANTOIN 25; 75 MG/1; MG/1
100 CAPSULE ORAL 2 TIMES DAILY
Qty: 10 CAPSULE | Refills: 0 | Status: SHIPPED | OUTPATIENT
Start: 2024-01-09 | End: 2024-01-14

## 2024-01-09 ASSESSMENT — ENCOUNTER SYMPTOMS
DIARRHEA: 0
CHILLS: 0
VOMITING: 0
ABDOMINAL PAIN: 0
FLANK PAIN: 0
NAUSEA: 0
FEVER: 0

## 2024-01-09 NOTE — PROGRESS NOTES
"Subjective:   Mavis Davison  is a 48 y.o. female who presents for Dysuria (4X day; )    Visit completed with use of translating software  Dysuria   This is a new problem. The current episode started in the past 7 days. Associated symptoms include frequency and urgency. Pertinent negatives include no chills, flank pain, hematuria, nausea or vomiting.   Patient presents urgent care describing last 3 days of dysuria frequency of urination and incomplete voiding.  She notes some generalized abdominal discomfort focused on suprapubic area.  Denies flank pain.  She denies abnormal vaginal discharge.  She notes past medical history of UTI with similar symptoms that was negative on urinalysis but been positive on urine culture.  Patient had been treated with Macrobid at that time and symptoms improved rapidly after treatment began.  Culture at that time showed E. coli sensitive to Macrobid.    Review of Systems   Constitutional:  Negative for chills and fever.   Gastrointestinal:  Negative for abdominal pain, diarrhea, nausea and vomiting.   Genitourinary:  Positive for dysuria, frequency and urgency. Negative for flank pain and hematuria.   Skin:  Negative for rash.       Allergies   Allergen Reactions    Penicillins Rash     Pt states \"I get a rash all over my body\".        Objective:   /74 (BP Location: Right arm, Patient Position: Sitting, BP Cuff Size: Adult long)   Pulse 81   Temp 36.3 °C (97.4 °F) (Temporal)   Resp 20   Ht 1.575 m (5' 2\")   Wt 85.7 kg (189 lb)   SpO2 96%   BMI 34.57 kg/m²     Physical Exam  Vitals and nursing note reviewed.   Constitutional:       General: She is not in acute distress.     Appearance: Normal appearance. She is well-developed. She is not diaphoretic.   HENT:      Head: Normocephalic and atraumatic.      Right Ear: External ear normal.      Left Ear: External ear normal.      Nose: Nose normal.   Eyes:      General: Lids are normal. No scleral icterus.        " Right eye: No discharge.         Left eye: No discharge.      Conjunctiva/sclera: Conjunctivae normal.   Pulmonary:      Effort: Pulmonary effort is normal. No respiratory distress.   Abdominal:      Palpations: Abdomen is soft. Abdomen is not rigid.      Tenderness: There is abdominal tenderness ( very mild suprapubic) in the suprapubic area. There is no right CVA tenderness, left CVA tenderness or guarding.   Musculoskeletal:         General: Normal range of motion.      Cervical back: Neck supple.   Skin:     General: Skin is warm and dry.      Coloration: Skin is not pale.      Findings: No erythema.   Neurological:      Mental Status: She is alert and oriented to person, place, and time. She is not disoriented.   Psychiatric:         Speech: Speech normal.         Behavior: Behavior normal.       Results for orders placed or performed in visit on 01/09/24   POCT Urinalysis   Result Value Ref Range    POC Color Light Yellow Negative    POC Appearance Slighty Coudy Negative    POC Glucose 1,000 Negative mg/dL    POC Bilirubin Negative Negative mg/dL    POC Ketones Trace Negative mg/dL    POC Specific Gravity 1.015 <1.005 - >1.030    POC Blood Trace-intact Negative    POC Urine PH 7.0 5.0 - 8.0    POC Protein Negative Negative mg/dL    POC Urobiligen 0.2 Negative (0.2) mg/dL    POC Nitrites Negative Negative    POC Leukocyte Esterase Negative Negative     Urinalysis similar in appearance to last workup for UTI where urine culture was positive for E. coli.      Assessment/Plan:   1. UTI symptoms  - nitrofurantoin (MACROBID) 100 MG Cap; Take 1 Capsule by mouth 2 times a day for 5 days.  Dispense: 10 Capsule; Refill: 0    2. Dysuria  - POCT Urinalysis  Supportive care is reviewed with patient/caregiver - recommend to push PO fluids and electrolytes, nsaids/tylenol, rest, full course of abx, probiotics w/ abx, azo/cran, observe for resolution  Return to clinic with lack of resolution or progression of  symptoms.    Patient without insurance.  Will she reports good resolution of symptoms with similar treatment with last UTI.  Discussed with patient would prefer to send her urine culture we will hold off doing so due to cost.  If symptoms persist or with lack of resolution would feel strongly about urine culture being sent.      I have worn an N95 mask, gloves and eye protection for the entire encounter with this patient.     Differential diagnosis, natural history, supportive care, and indications for immediate follow-up discussed.

## 2024-10-17 ENCOUNTER — APPOINTMENT (OUTPATIENT)
Dept: URGENT CARE | Facility: CLINIC | Age: 49
End: 2024-10-17

## 2025-05-10 ENCOUNTER — OFFICE VISIT (OUTPATIENT)
Dept: URGENT CARE | Facility: CLINIC | Age: 50
End: 2025-05-10

## 2025-05-10 VITALS
BODY MASS INDEX: 38.83 KG/M2 | HEIGHT: 58 IN | OXYGEN SATURATION: 96 % | WEIGHT: 185 LBS | RESPIRATION RATE: 16 BRPM | TEMPERATURE: 97.5 F | SYSTOLIC BLOOD PRESSURE: 142 MMHG | HEART RATE: 86 BPM | DIASTOLIC BLOOD PRESSURE: 86 MMHG

## 2025-05-10 DIAGNOSIS — J98.01 COUGH DUE TO BRONCHOSPASM: ICD-10-CM

## 2025-05-10 DIAGNOSIS — R03.0 ELEVATED BLOOD PRESSURE READING: ICD-10-CM

## 2025-05-10 PROCEDURE — 3077F SYST BP >= 140 MM HG: CPT

## 2025-05-10 PROCEDURE — 3079F DIAST BP 80-89 MM HG: CPT

## 2025-05-10 PROCEDURE — 99214 OFFICE O/P EST MOD 30 MIN: CPT

## 2025-05-10 RX ORDER — DEXTROMETHORPHAN HYDROBROMIDE AND PROMETHAZINE HYDROCHLORIDE 15; 6.25 MG/5ML; MG/5ML
5 SYRUP ORAL EVERY 6 HOURS PRN
Qty: 100 ML | Refills: 0 | Status: SHIPPED | OUTPATIENT
Start: 2025-05-10

## 2025-05-10 RX ORDER — LISINOPRIL 2.5 MG/1
2.5 TABLET ORAL DAILY
COMMUNITY
Start: 2025-03-11

## 2025-05-10 RX ORDER — BENZONATATE 100 MG/1
100 CAPSULE ORAL 3 TIMES DAILY PRN
Qty: 60 CAPSULE | Refills: 0 | Status: SHIPPED | OUTPATIENT
Start: 2025-05-10

## 2025-05-10 RX ORDER — SITAGLIPTIN 100 MG/1
100 TABLET, FILM COATED ORAL DAILY
COMMUNITY
Start: 2025-03-11

## 2025-05-10 ASSESSMENT — ENCOUNTER SYMPTOMS: COUGH: 1

## 2025-05-10 NOTE — PROGRESS NOTES
Subjective:   Mavis Davison is a 50 y.o. female    Patient presents to the clinic for complaints of cough x 5 days.  Cough has been dry cough and non productive. Keeps her up at night and having coughing episodes.   Coughing is severe enough that she is having some back and chest discomfort.   No known sick exposure.   Has taken OTC cough/cold meds.   Patient denies chest pain, SOB, dizziness/lightheadedness/vertigo, nausea/vomiting/diarrhea, difficulty breathing or swallowing, palpitations or racing heart rate, fever, chills, wheezing, or abdominal pain.      Cough        Review of Systems   Respiratory:  Positive for cough.      Refer to HPI for additional details.    During this visit, appropriate PPE was worn, and hand hygiene was performed.    PMH:  has a past medical history of GDM (gestational diabetes mellitus) (3/8/2012).    She has no past medical history of Addisons disease (HCC), Adrenal disorder (HCC), Allergy, Anemia, Anxiety, Arrhythmia, Arthritis, ASTHMA, Blood transfusion, Cancer (HCC), CATARACT, CHF (congestive heart failure) ([facility]), Clotting disorder (HCC), COPD, Cushings syndrome (HCC), Depression, Diabetic neuropathy (HCC), EMPHYSEMA, GERD (gastroesophageal reflux disease), Glaucoma, Goiter, Headache(784.0), Heart attack (HCC), Heart murmur, HIV (human immunodeficiency virus infection), Hyperlipidemia, Hypertension, IBD (inflammatory bowel disease), Kidney disease, Meningitis, Migraine, Muscle disorder, OSTEOPOROSIS, Parathyroid disorder (HCC), Pituitary disease (HCC), Seizure (HCC), Sickle cell disease (HCC), Stroke ([facility]), Substance abuse (HCC), Thyroid disease, Tuberculosis, Ulcer, or Urinary tract infection, site not specified.    MEDS:   Current Outpatient Medications:     JANUVIA 100 MG Tab, Take 100 mg by mouth every day., Disp: , Rfl:     benzonatate (TESSALON) 100 MG Cap, Take 1 Capsule by mouth 3 times a day as needed for Cough., Disp: 60 Capsule, Rfl: 0     promethazine-dextromethorphan (PROMETHAZINE-DM) 6.25-15 MG/5ML syrup, Take 5 mL by mouth every 6 hours as needed for Cough for up to 20 doses. (Note: may cause drowsiness, do not drive or operate heavy machinery while taking), Disp: 100 mL, Rfl: 0    metFORMIN ER (GLUCOPHAGE XR) 500 MG TABLET SR 24 HR, Take 1,000 mg by mouth 2 times a day., Disp: , Rfl:     rosuvastatin (CRESTOR) 10 MG Tab, Take 10 mg by mouth every day., Disp: , Rfl:     lisinopril (PRINIVIL) 2.5 MG Tab, Take 2.5 mg by mouth every day. (Patient not taking: Reported on 5/10/2025), Disp: , Rfl:     fluconazole (DIFLUCAN) 150 MG tablet, Take one tablet orally for yeast infection, if symptoms persist, may repeat treatment after 72 hours. (Patient not taking: Reported on 5/10/2025), Disp: 2 Tablet, Rfl: 0    Nirmatrelvir&Ritonavir 300/100 20 x 150 MG & 10 x 100MG Tablet Therapy Pack, Take 300 mg nirmatrelvir (two 150 mg tablets) with 100 mg ritonavir (one 100 mg tablet) by mouth, with all three tablets taken together twice daily for 5 days. (Patient not taking: Reported on 5/10/2025), Disp: 30 Each, Rfl: 0    lidocaine (XYLOCAINE) 2 % Solution, Take 15 mL by mouth as needed for Throat/Mouth Pain (Swish, gargle, and spit up to 5 times daily for sore throat). (Patient not taking: Reported on 5/10/2025), Disp: 100 mL, Rfl: 0    JARDIANCE 25 MG Tab, Take 1 Tablet by mouth every day. (Patient not taking: Reported on 5/10/2025), Disp: , Rfl:     pioglitazone (ACTOS) 45 MG Tab, Take 45 mg by mouth every day. (Patient not taking: Reported on 5/10/2025), Disp: , Rfl:     METFORMIN HCL ER PO, Take  by mouth. (Patient not taking: Reported on 5/10/2025), Disp: , Rfl:     ROSUVASTATIN CALCIUM PO, Take  by mouth. (Patient not taking: Reported on 5/10/2025), Disp: , Rfl:     Lidocaine-Hydrocortisone Ace 3-1 % Kit, Insert 1 Application in rectum 2 Times a Day. (Patient not taking: Reported on 5/10/2025), Disp: 1 Kit, Rfl: 1    docusate sodium (COLACE) 50 MG Cap, Take 2  "Caps by mouth 2 times a day. (Patient not taking: Reported on 5/10/2025), Disp: 20 Cap, Rfl: 0    hydrOXYzine pamoate (VISTARIL) 25 MG Cap, Take 1 Cap by mouth every 8 hours as needed for Anxiety (May cause sedation). (Patient not taking: Reported on 5/10/2025), Disp: 28 Cap, Rfl: 0    fluticasone (FLONASE) 50 MCG/ACT nasal spray, Spray 2 Sprays in nose every day. (Patient not taking: Reported on 5/10/2025), Disp: 16 g, Rfl: 3    pantoprazole (PROTONIX) 40 MG TBEC, Take 1 Tab by mouth every day. (Patient not taking: Reported on 5/10/2025), Disp: 30 Tab, Rfl: 0    ALLERGIES:   Allergies   Allergen Reactions    Penicillins Rash     Pt states \"I get a rash all over my body\".     SURGHX:   Past Surgical History:   Procedure Laterality Date    CHOLECYSTECTOMY  2000     SOCHX:  reports that she has never smoked. She has never used smokeless tobacco. She reports that she does not drink alcohol and does not use drugs.    FH: Per HPI as applicable/pertinent.    Medications, Allergies, and current problem list reviewed today in Epic.     Objective:     BP (!) 142/86 (BP Location: Left arm, Patient Position: Sitting, BP Cuff Size: Adult)   Pulse 86   Temp 36.4 °C (97.5 °F) (Temporal)   Resp 16   Ht 1.473 m (4' 10\")   Wt 83.9 kg (185 lb)   SpO2 96%     Physical Exam  Constitutional:       Appearance: Normal appearance. She is not ill-appearing or toxic-appearing.   HENT:      Head: Normocephalic.      Right Ear: Tympanic membrane, ear canal and external ear normal.      Left Ear: Tympanic membrane, ear canal and external ear normal.      Nose: Nose normal. No congestion or rhinorrhea.      Mouth/Throat:      Mouth: Mucous membranes are moist.      Pharynx: Oropharynx is clear. No oropharyngeal exudate or posterior oropharyngeal erythema.   Eyes:      General:         Right eye: No discharge.         Left eye: No discharge.      Extraocular Movements: Extraocular movements intact.      Conjunctiva/sclera: Conjunctivae " normal.      Pupils: Pupils are equal, round, and reactive to light.   Cardiovascular:      Rate and Rhythm: Normal rate and regular rhythm.      Pulses: Normal pulses.      Heart sounds: Normal heart sounds. No murmur heard.  Pulmonary:      Effort: Pulmonary effort is normal. No respiratory distress.      Breath sounds: Normal breath sounds. No wheezing or rhonchi.      Comments: Bronchospastic coughing in clinic  Abdominal:      General: Abdomen is flat.   Musculoskeletal:         General: No signs of injury. Normal range of motion.      Cervical back: Normal range of motion. No rigidity.   Lymphadenopathy:      Cervical: No cervical adenopathy.   Skin:     General: Skin is warm and dry.   Neurological:      General: No focal deficit present.      Mental Status: She is alert and oriented to person, place, and time.      Motor: No weakness.         Assessment/Plan:     Diagnosis and associated orders:     1. Cough due to bronchospasm  - benzonatate (TESSALON) 100 MG Cap; Take 1 Capsule by mouth 3 times a day as needed for Cough.  Dispense: 60 Capsule; Refill: 0  - promethazine-dextromethorphan (PROMETHAZINE-DM) 6.25-15 MG/5ML syrup; Take 5 mL by mouth every 6 hours as needed for Cough for up to 20 doses. (Note: may cause drowsiness, do not drive or operate heavy machinery while taking)  Dispense: 100 mL; Refill: 0    2. Elevated blood pressure reading  - Referral back to PCP    Other orders  - lisinopril (PRINIVIL) 2.5 MG Tab; Take 2.5 mg by mouth every day. (Patient not taking: Reported on 5/10/2025)  - JANUVIA 100 MG Tab; Take 100 mg by mouth every day.     Comments/MDM:     Discussed that likely etiology of the patient's symptoms is bronchospastic cough.  Tessalon and Promethazine DM as needed prescribed to the patient. Discussed proper administration and dosing as well as associated adverse/side effects of prescribed medications.  Advised patient to continue OTC pharmacologic and nonpharmacologic treatment of  her symptoms, including but not limited to Tylenol, Motrin, OTC cough and cold medications, and plenty of rest and fluids.  Patient with elevated blood pressure reading during this visit.  Discussed lifestyle changes/modifications and medications involved with blood pressure control as well as monitoring blood pressure at home with home measuring and keeping a log of BP readings.  Cough may be contributory to elevated blood pressure.  Referral to PCP placed for the patient.  Patient agreeable to this plan of care.  All questions answered.  Return to clinic if symptoms persist or worsen.  Red flag symptoms warranting emergency medical services discussed, including but not limited to chest pain, SOB, wheezing, difficulty breathing or swallowing, sensation of throat closing or mass in the throat, severe intractable headache, changes to vision or hearing, palpitations or racing heart rate, abdominal pain, fever greater than 103F despite medication management, dizziness/lightheadedness/vertigo, or nausea/vomiting/diarrhea.           Differential diagnosis, natural history, supportive care, and indications for immediate follow-up discussed.    Advised the patient to follow-up with the primary care physician for recheck, reevaluation, and consideration of further management.    Instructed patient to seek emergency medical attention via calling EMS or going to the Emergency Room for red flag symptoms, including but not limited to: chest pain, palpitations, fever greater than 103F, shortness of breath, wheezing, new or worsened numbness/tingling, focal or unilateral weakness, and bloody vomit/stool.     Please note that this dictation was created using voice recognition software. I have made a reasonable attempt to correct obvious errors, but I expect that there are errors of grammar and possibly content that I did not discover before finalizing the note.    This note was electronically signed by Manuel Traore  RENÉE

## 2025-07-02 ENCOUNTER — TELEPHONE (OUTPATIENT)
Dept: HEALTH INFORMATION MANAGEMENT | Facility: OTHER | Age: 50
End: 2025-07-02